# Patient Record
Sex: MALE | Race: BLACK OR AFRICAN AMERICAN | Employment: FULL TIME | URBAN - METROPOLITAN AREA
[De-identification: names, ages, dates, MRNs, and addresses within clinical notes are randomized per-mention and may not be internally consistent; named-entity substitution may affect disease eponyms.]

---

## 2017-04-10 PROCEDURE — 99284 EMERGENCY DEPT VISIT MOD MDM: CPT | Performed by: EMERGENCY MEDICINE

## 2017-04-10 PROCEDURE — 77030013733

## 2017-04-11 ENCOUNTER — HOSPITAL ENCOUNTER (EMERGENCY)
Age: 32
Discharge: HOME | End: 2017-04-11
Attending: EMERGENCY MEDICINE
Payer: OTHER MISCELLANEOUS

## 2017-04-11 VITALS
RESPIRATION RATE: 18 BRPM | DIASTOLIC BLOOD PRESSURE: 62 MMHG | SYSTOLIC BLOOD PRESSURE: 110 MMHG | TEMPERATURE: 98.1 F | HEART RATE: 68 BPM | BODY MASS INDEX: 17.18 KG/M2 | OXYGEN SATURATION: 97 % | HEIGHT: 70 IN | WEIGHT: 120 LBS

## 2017-04-11 DIAGNOSIS — H10.211 CHEMICAL CONJUNCTIVITIS OF RIGHT EYE: Primary | ICD-10-CM

## 2017-04-11 PROCEDURE — 74011000250 HC RX REV CODE- 250: Performed by: EMERGENCY MEDICINE

## 2017-04-11 PROCEDURE — 74011250637 HC RX REV CODE- 250/637: Performed by: EMERGENCY MEDICINE

## 2017-04-11 RX ORDER — OXYCODONE HYDROCHLORIDE 5 MG/1
5 TABLET ORAL
Status: COMPLETED | OUTPATIENT
Start: 2017-04-11 | End: 2017-04-11

## 2017-04-11 RX ORDER — TETRACAINE HYDROCHLORIDE 5 MG/ML
2 SOLUTION OPHTHALMIC
Status: COMPLETED | OUTPATIENT
Start: 2017-04-11 | End: 2017-04-11

## 2017-04-11 RX ORDER — KETOROLAC TROMETHAMINE 5 MG/ML
1 SOLUTION OPHTHALMIC 4 TIMES DAILY
Qty: 1 BOTTLE | Refills: 0 | Status: SHIPPED | OUTPATIENT
Start: 2017-04-11 | End: 2017-04-16

## 2017-04-11 RX ORDER — OXYCODONE HYDROCHLORIDE 5 MG/1
5 TABLET ORAL
Qty: 19 TAB | Refills: 0 | Status: SHIPPED | OUTPATIENT
Start: 2017-04-11 | End: 2017-04-26

## 2017-04-11 RX ADMIN — FLUORESCEIN SODIUM 1 STRIP: 1 STRIP OPHTHALMIC at 00:28

## 2017-04-11 RX ADMIN — TETRACAINE HYDROCHLORIDE 2 DROP: 5 SOLUTION OPHTHALMIC at 00:28

## 2017-04-11 RX ADMIN — OXYCODONE HYDROCHLORIDE 5 MG: 5 TABLET ORAL at 03:28

## 2017-04-11 NOTE — ED PROVIDER NOTES
HPI Comments: 28-year-old male working at [employer] when a splash from a broken dishwashing liquid bottle splashed in his right eye. Patient states that he will rinse his eye with water at work, but due to persistent discomfort, presented to the ER for further evaluation. No prior history of eye injuries. Does not work contacts    Patient is a 32 y.o. male presenting with eye pain. The history is provided by the patient. Eye Pain    This is a new problem. The current episode started 1 to 2 hours ago. The problem occurs constantly. The problem has been gradually improving. The right eye is affected. The injury mechanism was a chemical exposure (patient splashed dishwashing soap in his right eye at work). The pain is moderate. There is no known exposure to pink eye. He does not wear contacts. Associated symptoms include blurred vision, eye redness and pain. Pertinent negatives include no numbness, no discharge, no photophobia, no nausea, no vomiting, no tingling, no weakness, no itching, no fever, no blindness, no head injury and no dizziness. He has tried water for the symptoms. No past medical history on file. No past surgical history on file. No family history on file. Social History     Social History    Marital status: SINGLE     Spouse name: N/A    Number of children: N/A    Years of education: N/A     Occupational History    Not on file. Social History Main Topics    Smoking status: Never Smoker    Smokeless tobacco: Never Used    Alcohol use Yes      Comment: occa    Drug use: No    Sexual activity: Not on file     Other Topics Concern    Not on file     Social History Narrative         ALLERGIES: Review of patient's allergies indicates no known allergies. Review of Systems   Constitutional: Negative for activity change, chills, diaphoresis and fever. HENT: Negative for dental problem, hearing loss, nosebleeds, rhinorrhea and sore throat.     Eyes: Positive for blurred vision, pain and redness. Negative for blindness, photophobia, discharge and visual disturbance. Respiratory: Negative for cough, chest tightness and shortness of breath. Cardiovascular: Negative for chest pain, palpitations and leg swelling. Gastrointestinal: Negative for abdominal pain, constipation, diarrhea, nausea and vomiting. Endocrine: Negative for cold intolerance, heat intolerance, polydipsia and polyuria. Genitourinary: Negative for dysuria and flank pain. Musculoskeletal: Negative for arthralgias, back pain, joint swelling, myalgias and neck pain. Skin: Negative for itching, pallor and rash. Allergic/Immunologic: Negative for environmental allergies and food allergies. Neurological: Negative for dizziness, tingling, tremors, weakness, light-headedness, numbness and headaches. Hematological: Negative for adenopathy. Does not bruise/bleed easily. Psychiatric/Behavioral: Negative for confusion and dysphoric mood. The patient is not nervous/anxious and is not hyperactive. All other systems reviewed and are negative. Vitals:    04/11/17 0012   BP: 117/57   Pulse: 65   Resp: 18   Temp: 98.1 °F (36.7 °C)   SpO2: 98%   Weight: 54.4 kg (120 lb)   Height: 5' 10\" (1.778 m)            Physical Exam   Constitutional: He is oriented to person, place, and time. He appears well-developed and well-nourished. No distress. HENT:   Head: Normocephalic and atraumatic. Mouth/Throat: Oropharynx is clear and moist.   Eyes: Conjunctivae and EOM are normal. Pupils are equal, round, and reactive to light. Right eye exhibits no discharge. Left eye exhibits no discharge. No scleral icterus. Conjunctiva and sclera of the right eye are mildly erythematous and appears somewhat irritated. Pupils equal  To the left side  Pupil is reactive  Pupil shape is rounded     Neck: Normal range of motion. Neck supple. No JVD present.    Cardiovascular: Normal rate, regular rhythm, normal heart sounds and intact distal pulses. Exam reveals no gallop and no friction rub. No murmur heard. Pulmonary/Chest: Effort normal and breath sounds normal. No respiratory distress. He has no wheezes. Abdominal: Soft. Bowel sounds are normal. He exhibits no distension. There is no tenderness. There is no rebound and no guarding. Musculoskeletal: Normal range of motion. He exhibits no edema or tenderness. Lymphadenopathy:     He has no cervical adenopathy. Neurological: He is alert and oriented to person, place, and time. He has normal strength. No cranial nerve deficit or sensory deficit. He exhibits normal muscle tone. GCS eye subscore is 4. GCS verbal subscore is 5. GCS motor subscore is 6. Skin: Skin is warm and dry. No rash noted. He is not diaphoretic. No erythema. Psychiatric: He has a normal mood and affect. His speech is normal and behavior is normal. Judgment and thought content normal. Cognition and memory are normal.   Nursing note and vitals reviewed. MDM  Number of Diagnoses or Management Options  Chemical conjunctivitis of right eye: new and requires workup  Diagnosis management comments: Tetracaine and forcing applied to the right eye. There is no floor seen uptake noted on direct examination with a Woods lamp. We'll go ahead and irrigated the eye with 1 L normal saline via Lexa lens. Plan to check visual acuities    Likely this is only a mild chemical irritant related to the dishwashing soap. Amount and/or Complexity of Data Reviewed  Tests in the medicine section of CPT®: ordered and reviewed  Review and summarize past medical records: yes    Risk of Complications, Morbidity, and/or Mortality  Presenting problems: moderate  Diagnostic procedures: moderate  Management options: moderate  General comments: Elements of this note have been dictated via voice recognition software. Text and phrases may be limited by the accuracy of the software.   The chart has been reviewed, but errors may still be present.       Patient Progress  Patient progress: improved    ED Course       Procedures

## 2017-04-11 NOTE — ED NOTES
I have reviewed discharge instructions with the patient. The patient verbalized understanding. Patient had a eye patch, eye protector, wrapped in coban placed over the right eye prior to discharge. Patient called ride to come pick him up.

## 2017-04-11 NOTE — DISCHARGE INSTRUCTIONS
Pinkeye: Care Instructions  Your Care Instructions    Pinkeye is redness and swelling of the eye surface and the conjunctiva (the lining of the eyelid and the covering of the white part of the eye). Pinkeye is also called conjunctivitis. Pinkeye is often caused by infection with bacteria or a virus. Dry air, allergies, smoke, and chemicals are other common causes. Pinkeye often clears on its own in 7 to 10 days. Antibiotics only help if the pinkeye is caused by bacteria. Pinkeye caused by infection spreads easily. If an allergy or chemical is causing pinkeye, it will not go away unless you can avoid whatever is causing it. Follow-up care is a key part of your treatment and safety. Be sure to make and go to all appointments, and call your doctor if you are having problems. Its also a good idea to know your test results and keep a list of the medicines you take. How can you care for yourself at home? · Wash your hands often. Always wash them before and after you treat pinkeye or touch your eyes or face. · Use moist cotton or a clean, wet cloth to remove crust. Wipe from the inside corner of the eye to the outside. Use a clean part of the cloth for each wipe. · Put cold or warm wet cloths on your eye a few times a day if the eye hurts. · Do not wear contact lenses or eye makeup until the pinkeye is gone. Throw away any eye makeup you were using when you got pinkeye. Clean your contacts and storage case. If you wear disposable contacts, use a new pair when your eye has cleared and it is safe to wear contacts again. · If the doctor gave you antibiotic ointment or eyedrops, use them as directed. Use the medicine for as long as instructed, even if your eye starts looking better soon. Keep the bottle tip clean, and do not let it touch the eye area. · To put in eyedrops or ointment:  ¨ Tilt your head back, and pull your lower eyelid down with one finger.   ¨ Drop or squirt the medicine inside the lower lid.  ¨ Close your eye for 30 to 60 seconds to let the drops or ointment move around. ¨ Do not touch the ointment or dropper tip to your eyelashes or any other surface. · Do not share towels, pillows, or washcloths while you have pinkeye. When should you call for help? Call your doctor now or seek immediate medical care if:  · You have pain in your eye, not just irritation on the surface. · You have a change in vision or loss of vision. · You have an increase in discharge from the eye. · Your eye has not started to improve or begins to get worse within 48 hours after you start using antibiotics. · Pinkeye lasts longer than 7 days. Watch closely for changes in your health, and be sure to contact your doctor if you have any problems. Where can you learn more? Go to http://li-dalila.info/. Enter Y392 in the search box to learn more about \"Pinkeye: Care Instructions. \"  Current as of: May 27, 2016  Content Version: 11.2  © 7705-4043 [employer]. Care instructions adapted under license by [employer] (which disclaims liability or warranty for this information). If you have questions about a medical condition or this instruction, always ask your healthcare professional. [address] any warranty or liability for your use of this information.

## 2017-04-26 ENCOUNTER — HOSPITAL ENCOUNTER (EMERGENCY)
Age: 32
Discharge: HOME | End: 2017-04-26
Attending: EMERGENCY MEDICINE
Payer: SELF-PAY

## 2017-04-26 VITALS
SYSTOLIC BLOOD PRESSURE: 102 MMHG | BODY MASS INDEX: 17.18 KG/M2 | WEIGHT: 120 LBS | DIASTOLIC BLOOD PRESSURE: 68 MMHG | TEMPERATURE: 98.8 F | HEIGHT: 70 IN | RESPIRATION RATE: 16 BRPM | OXYGEN SATURATION: 98 % | HEART RATE: 80 BPM

## 2017-04-26 DIAGNOSIS — R68.89 FLU-LIKE SYMPTOMS: ICD-10-CM

## 2017-04-26 DIAGNOSIS — J02.9 SORE THROAT: Primary | ICD-10-CM

## 2017-04-26 LAB
APPEARANCE UR: CLEAR
BACTERIA URNS QL MICRO: 0 /HPF
BILIRUB UR QL: NEGATIVE
CASTS URNS QL MICRO: 0 /LPF
COLOR UR: YELLOW
DEPRECATED S PYO AG THROAT QL EIA: NEGATIVE
EPI CELLS #/AREA URNS HPF: 0 /HPF
FLUAV AG NPH QL IA: NEGATIVE
FLUBV AG NPH QL IA: NEGATIVE
GLUCOSE UR STRIP.AUTO-MCNC: NEGATIVE MG/DL
HGB UR QL STRIP: ABNORMAL
KETONES UR QL STRIP.AUTO: NEGATIVE MG/DL
LEUKOCYTE ESTERASE UR QL STRIP.AUTO: NEGATIVE
NITRITE UR QL STRIP.AUTO: NEGATIVE
PH UR STRIP: 5.5 [PH] (ref 5–9)
PROT UR STRIP-MCNC: NEGATIVE MG/DL
RBC #/AREA URNS HPF: ABNORMAL /HPF
SP GR UR REFRACTOMETRY: 1.02 (ref 1–1.02)
UROBILINOGEN UR QL STRIP.AUTO: 0.2 EU/DL (ref 0.2–1)
WBC URNS QL MICRO: ABNORMAL /HPF

## 2017-04-26 PROCEDURE — 99283 EMERGENCY DEPT VISIT LOW MDM: CPT | Performed by: EMERGENCY MEDICINE

## 2017-04-26 PROCEDURE — 87804 INFLUENZA ASSAY W/OPTIC: CPT | Performed by: EMERGENCY MEDICINE

## 2017-04-26 PROCEDURE — 87880 STREP A ASSAY W/OPTIC: CPT | Performed by: EMERGENCY MEDICINE

## 2017-04-26 PROCEDURE — 87081 CULTURE SCREEN ONLY: CPT | Performed by: EMERGENCY MEDICINE

## 2017-04-26 PROCEDURE — 81003 URINALYSIS AUTO W/O SCOPE: CPT | Performed by: EMERGENCY MEDICINE

## 2017-04-26 PROCEDURE — 81001 URINALYSIS AUTO W/SCOPE: CPT | Performed by: PHYSICIAN ASSISTANT

## 2017-04-26 RX ORDER — IBUPROFEN 600 MG/1
600 TABLET ORAL
Qty: 20 TAB | Refills: 0 | Status: SHIPPED | OUTPATIENT
Start: 2017-04-26 | End: 2017-08-06

## 2017-04-26 NOTE — LETTER
[facility] EMERGENCY DEPT [address] [address] 
[phone] Work/School Note Date: 4/26/2017 To Whom It May concern: Kylie Baker was seen and treated today in the emergency room by the following provider(s): 
Attending Provider: Radha Lambert MD 
Physician Assistant: Alexander Gomez. Kylie Baker SHOULD BE EXCUSED FROM WORK ON 4/26/2017. Sincerely, KHADRA Gomez

## 2017-04-27 ENCOUNTER — HOSPITAL ENCOUNTER (EMERGENCY)
Age: 32
Discharge: HOME | End: 2017-04-27
Attending: EMERGENCY MEDICINE
Payer: SELF-PAY

## 2017-04-27 VITALS
TEMPERATURE: 99 F | HEIGHT: 70 IN | HEART RATE: 79 BPM | SYSTOLIC BLOOD PRESSURE: 98 MMHG | BODY MASS INDEX: 17.18 KG/M2 | RESPIRATION RATE: 18 BRPM | OXYGEN SATURATION: 100 % | WEIGHT: 120 LBS | DIASTOLIC BLOOD PRESSURE: 65 MMHG

## 2017-04-27 DIAGNOSIS — J02.9 VIRAL PHARYNGITIS: Primary | ICD-10-CM

## 2017-04-27 PROCEDURE — 74011250637 HC RX REV CODE- 250/637: Performed by: EMERGENCY MEDICINE

## 2017-04-27 PROCEDURE — 99283 EMERGENCY DEPT VISIT LOW MDM: CPT | Performed by: EMERGENCY MEDICINE

## 2017-04-27 RX ORDER — IBUPROFEN 800 MG/1
800 TABLET ORAL
Status: COMPLETED | OUTPATIENT
Start: 2017-04-27 | End: 2017-04-27

## 2017-04-27 RX ADMIN — IBUPROFEN 800 MG: 800 TABLET ORAL at 19:48

## 2017-04-27 NOTE — LETTER
NOTIFICATION RETURN TO WORK / SCHOOL 
 
4/27/2017 7:53 PM 
 
Mr. Kylie Baker 
[address] [place] [id number] To Whom It May Concern: Kylie Baker is currently under the care of [facility] EMERGENCY DEPT. He will return to work/school on: 4/28/2017 If there are questions or concerns please have the patient contact our office.  
 
 
 
Sincerely, 
 
 
Martine Sun RN

## 2017-04-27 NOTE — ED PROVIDER NOTES
HPI Comments: Patient states that at 5:00 AM today he woke up with a sore throat and painful swallowing. He states he had generalized body aches and \"felt feverish\". Patient states he felt \"bad\" this morning and did not go to work and is requesting a note to be out of work today. Patient is a 28 y.o. male presenting with sore throat. The history is provided by the patient. Sore Throat    This is a new problem. The current episode started 12 to 24 hours ago. The problem has not changed since onset. Patient reports a subjective fever - was not measured. The fever has been present for less than 1 day. Associated symptoms include diarrhea and congestion. Pertinent negatives include no vomiting, no drooling, no ear pain, no headaches, no shortness of breath, no swollen glands, no trouble swallowing, no stiff neck and no cough. He has tried nothing for the symptoms. No past medical history on file. No past surgical history on file. No family history on file. Social History     Social History    Marital status: SINGLE     Spouse name: N/A    Number of children: N/A    Years of education: N/A     Occupational History    Not on file. Social History Main Topics    Smoking status: Never Smoker    Smokeless tobacco: Never Used    Alcohol use Yes      Comment: occa    Drug use: No    Sexual activity: Not on file     Other Topics Concern    Not on file     Social History Narrative         ALLERGIES: Review of patient's allergies indicates no known allergies. Review of Systems   Constitutional: Negative for chills, diaphoresis, fatigue and fever. HENT: Positive for congestion and sore throat. Negative for drooling, ear pain, facial swelling, postnasal drip, rhinorrhea, sneezing and trouble swallowing. Respiratory: Negative for cough, shortness of breath and wheezing. Gastrointestinal: Positive for diarrhea. Negative for abdominal pain, nausea and vomiting.    Musculoskeletal: Positive for myalgias. Negative for arthralgias, neck pain and neck stiffness. Skin: Negative for rash. Neurological: Negative for dizziness, speech difficulty, weakness, light-headedness and headaches. Hematological: Negative for adenopathy. All other systems reviewed and are negative. Vitals:    04/26/17 2130   BP: 96/69   Pulse: 82   Resp: 16   Temp: 99.2 °F (37.3 °C)   SpO2: 98%   Weight: 54.4 kg (120 lb)   Height: 5' 10\" (1.778 m)            Physical Exam   Constitutional: He is oriented to person, place, and time. Vital signs are normal. He appears well-developed and well-nourished. He is active. Non-toxic appearance. He does not appear ill. No distress. Patient is ambulatory and moving about and exam area without difficulty. Patient with clear non-garbled speech. HENT:   Head: Normocephalic and atraumatic. Right Ear: Tympanic membrane normal.   Left Ear: Tympanic membrane normal.   Nose: Nose normal.   Mouth/Throat: Uvula is midline. Posterior oropharyngeal erythema present. No oropharyngeal exudate, posterior oropharyngeal edema or tonsillar abscesses. Eyes: Conjunctivae and EOM are normal. Pupils are equal, round, and reactive to light. Neck: Normal range of motion. Neck supple. No spinous process tenderness and no muscular tenderness present. Cardiovascular: Normal rate, regular rhythm, S1 normal, S2 normal, normal heart sounds and normal pulses. Exam reveals no gallop. No murmur heard. Pulmonary/Chest: Effort normal and breath sounds normal. No accessory muscle usage. No respiratory distress. He has no decreased breath sounds. He has no wheezes. He has no rhonchi. He exhibits no tenderness. Abdominal: Soft. Normal appearance and bowel sounds are normal. He exhibits no distension, no abdominal bruit and no pulsatile midline mass. There is no hepatosplenomegaly. There is no tenderness. There is no rigidity, no rebound, no guarding and no CVA tenderness.    Neurological: He is alert and oriented to person, place, and time. He has normal strength and normal reflexes. No cranial nerve deficit or sensory deficit. Coordination and gait normal.   Skin: Skin is warm, dry and intact. Psychiatric: He has a normal mood and affect. His speech is normal and behavior is normal.   Nursing note and vitals reviewed. MDM  Number of Diagnoses or Management Options  Flu-like symptoms: new and requires workup  Sore throat: new and requires workup  Diagnosis management comments: This is likely a viral pharyngitis. Patient is prescribed Motrin for symptomatic treatment. He is advised to return with any change in symptoms or worsening symptoms. Amount and/or Complexity of Data Reviewed  Clinical lab tests: ordered and reviewed    Risk of Complications, Morbidity, and/or Mortality  Presenting problems: low  Diagnostic procedures: low  Management options: low    Patient Progress  Patient progress: stable    ED Course       Procedures      Rapid strep, Influenza A&B is negative. I discussed the results of all labs, procedures, radiographs, and treatments with the patient and available family. Treatment plan is agreed upon and the patient is ready for discharge. Questions about treatment in the ED and differential diagnosis of presenting condition were answered. Patient was given verbal discharge instructions including, but not limited to, importance of returning to the emergency department for any concern of worsening or continued symptoms. Instructions were given to follow up with a primary care provider or specialist within 1-2 days. Adverse effects of medications, if prescribed, were discussed and patient was advised to refrain from significant physical activity until followed up by primary care physician and to not drive or operate heavy machinery after taking any sedating substances.

## 2017-04-27 NOTE — ED TRIAGE NOTES
Pt states he has a sore throat and right ear pain. Pt states he was seen here in the ED last night. Pt had a neg Strep swab. Pt states he cannot ear or drink because the pain is so great.

## 2017-04-27 NOTE — DISCHARGE INSTRUCTIONS
Your strep test and flu test are NEGATIVE in ER tonight. Sore Throat: Care Instructions  Your Care Instructions    Infection by bacteria or a virus causes most sore throats. Cigarette smoke, dry air, air pollution, allergies, and yelling can also cause a sore throat. Sore throats can be painful and annoying. Fortunately, most sore throats go away on their own. If you have a bacterial infection, your doctor may prescribe antibiotics. Follow-up care is a key part of your treatment and safety. Be sure to make and go to all appointments, and call your doctor if you are having problems. It's also a good idea to know your test results and keep a list of the medicines you take. How can you care for yourself at home? · If your doctor prescribed antibiotics, take them as directed. Do not stop taking them just because you feel better. You need to take the full course of antibiotics. · Gargle with warm salt water once an hour to help reduce swelling and relieve discomfort. Use 1 teaspoon of salt mixed in 1 cup of warm water. · Take an over-the-counter pain medicine, such as acetaminophen (Tylenol), ibuprofen (Advil, Motrin), or naproxen (Aleve). Read and follow all instructions on the label. · Be careful when taking over-the-counter cold or flu medicines and Tylenol at the same time. Many of these medicines have acetaminophen, which is Tylenol. Read the labels to make sure that you are not taking more than the recommended dose. Too much acetaminophen (Tylenol) can be harmful. · Drink plenty of fluids. Fluids may help soothe an irritated throat. Hot fluids, such as tea or soup, may help decrease throat pain. · Use over-the-counter throat lozenges to soothe pain. Regular cough drops or hard candy may also help. These should not be given to young children because of the risk of choking. · Do not smoke or allow others to smoke around you.  If you need help quitting, talk to your doctor about stop-smoking programs and medicines. These can increase your chances of quitting for good. · Use a vaporizer or humidifier to add moisture to your bedroom. Follow the directions for cleaning the machine. When should you call for help? Call your doctor now or seek immediate medical care if:  · You have new or worse trouble swallowing. · Your sore throat gets much worse on one side. Watch closely for changes in your health, and be sure to contact your doctor if you do not get better as expected. Where can you learn more? Go to http://li-dalila.info/. Enter 062 [id number] 80 [id number] in the search box to learn more about \"Sore Throat: Care Instructions. \"  Current as of: July 29, 2016  Content Version: 11.2  © 2902-2299 [employer]. Care instructions adapted under license by [employer] (which disclaims liability or warranty for this information). If you have questions about a medical condition or this instruction, always ask your healthcare professional. [place] [address] any warranty or liability for your use of this information.

## 2017-04-27 NOTE — DISCHARGE INSTRUCTIONS
Sore Throat: Care Instructions  Your Care Instructions    Infection by bacteria or a virus causes most sore throats. Cigarette smoke, dry air, air pollution, allergies, and yelling can also cause a sore throat. Sore throats can be painful and annoying. Fortunately, most sore throats go away on their own. If you have a bacterial infection, your doctor may prescribe antibiotics. Follow-up care is a key part of your treatment and safety. Be sure to make and go to all appointments, and call your doctor if you are having problems. It's also a good idea to know your test results and keep a list of the medicines you take. How can you care for yourself at home? · If your doctor prescribed antibiotics, take them as directed. Do not stop taking them just because you feel better. You need to take the full course of antibiotics. · Gargle with warm salt water once an hour to help reduce swelling and relieve discomfort. Use 1 teaspoon of salt mixed in 1 cup of warm water. · Take an over-the-counter pain medicine, such as acetaminophen (Tylenol), ibuprofen (Advil, Motrin), or naproxen (Aleve). Read and follow all instructions on the label. · Be careful when taking over-the-counter cold or flu medicines and Tylenol at the same time. Many of these medicines have acetaminophen, which is Tylenol. Read the labels to make sure that you are not taking more than the recommended dose. Too much acetaminophen (Tylenol) can be harmful. · Drink plenty of fluids. Fluids may help soothe an irritated throat. Hot fluids, such as tea or soup, may help decrease throat pain. · Use over-the-counter throat lozenges to soothe pain. Regular cough drops or hard candy may also help. These should not be given to young children because of the risk of choking. · Do not smoke or allow others to smoke around you. If you need help quitting, talk to your doctor about stop-smoking programs and medicines.  These can increase your chances of quitting for good. · Use a vaporizer or humidifier to add moisture to your bedroom. Follow the directions for cleaning the machine. When should you call for help? Call your doctor now or seek immediate medical care if:  · You have new or worse trouble swallowing. · Your sore throat gets much worse on one side. Watch closely for changes in your health, and be sure to contact your doctor if you do not get better as expected. Where can you learn more? Go to http://li-dalila.info/. Enter [id number] [id number] in the search box to learn more about \"Sore Throat: Care Instructions. \"  Current as of: July 29, 2016  Content Version: 11.2  © 7859-3213 [employer]. Care instructions adapted under license by [employer] (which disclaims liability or warranty for this information). If you have questions about a medical condition or this instruction, always ask your healthcare professional. [address] any warranty or liability for your use of this information.

## 2017-04-29 LAB
BACTERIA SPEC CULT: NORMAL
SERVICE CMNT-IMP: NORMAL

## 2017-06-24 ENCOUNTER — HOSPITAL ENCOUNTER (EMERGENCY)
Age: 32
Discharge: HOME | End: 2017-06-24
Attending: EMERGENCY MEDICINE
Payer: SELF-PAY

## 2017-06-24 VITALS
TEMPERATURE: 98.3 F | HEART RATE: 80 BPM | RESPIRATION RATE: 16 BRPM | WEIGHT: 120 LBS | BODY MASS INDEX: 17.18 KG/M2 | HEIGHT: 70 IN | DIASTOLIC BLOOD PRESSURE: 70 MMHG | SYSTOLIC BLOOD PRESSURE: 108 MMHG | OXYGEN SATURATION: 100 %

## 2017-06-24 DIAGNOSIS — K62.5 RECTAL BLEEDING: Primary | ICD-10-CM

## 2017-06-24 LAB
BASOPHILS # BLD AUTO: 0 K/UL (ref 0–0.2)
BASOPHILS # BLD: 1 % (ref 0–2)
DIFFERENTIAL METHOD BLD: ABNORMAL
EOSINOPHIL # BLD: 0.1 K/UL (ref 0–0.8)
EOSINOPHIL NFR BLD: 3 % (ref 0.5–7.8)
ERYTHROCYTE [DISTWIDTH] IN BLOOD BY AUTOMATED COUNT: 12.2 % (ref 11.9–14.6)
HCT VFR BLD AUTO: 39.7 % (ref 41.1–50.3)
HGB BLD-MCNC: 14.4 G/DL (ref 13.6–17.2)
IMM GRANULOCYTES # BLD: 0 K/UL (ref 0–0.5)
IMM GRANULOCYTES NFR BLD AUTO: 0 % (ref 0–5)
LYMPHOCYTES # BLD AUTO: 44 % (ref 13–44)
LYMPHOCYTES # BLD: 2 K/UL (ref 0.5–4.6)
MCH RBC QN AUTO: 32.3 PG (ref 26.1–32.9)
MCHC RBC AUTO-ENTMCNC: 36.3 G/DL (ref 31.4–35)
MCV RBC AUTO: 89 FL (ref 79.6–97.8)
MONOCYTES # BLD: 0.1 K/UL (ref 0.1–1.3)
MONOCYTES NFR BLD AUTO: 3 % (ref 4–12)
NEUTS SEG # BLD: 2.4 K/UL (ref 1.7–8.2)
NEUTS SEG NFR BLD AUTO: 49 % (ref 43–78)
PLATELET # BLD AUTO: 298 K/UL (ref 150–450)
PLATELET COMMENTS,PCOM: ADEQUATE
PMV BLD AUTO: 9.1 FL (ref 10.8–14.1)
RBC # BLD AUTO: 4.46 M/UL (ref 4.23–5.67)
RBC MORPH BLD: ABNORMAL
WBC # BLD AUTO: 4.6 K/UL (ref 4.3–11.1)
WBC MORPH BLD: ABNORMAL

## 2017-06-24 PROCEDURE — 99283 EMERGENCY DEPT VISIT LOW MDM: CPT | Performed by: EMERGENCY MEDICINE

## 2017-06-24 PROCEDURE — 85025 COMPLETE CBC W/AUTO DIFF WBC: CPT | Performed by: EMERGENCY MEDICINE

## 2017-06-25 NOTE — ED PROVIDER NOTES
Patient is a 28 y.o. male presenting with anal bleeding. The history is provided by the patient. Rectal Bleeding    This is a new problem. The current episode started 12 to 24 hours ago. The stool is described as bloody coated. Pertinent negatives include no abdominal pain, no abdominal distention, no fever and no nausea. Risk factors include a recent illness. He has tried nothing for the symptoms. History reviewed. No pertinent past medical history. History reviewed. No pertinent surgical history. History reviewed. No pertinent family history. Social History     Social History    Marital status: SINGLE     Spouse name: N/A    Number of children: N/A    Years of education: N/A     Occupational History    Not on file. Social History Main Topics    Smoking status: Never Smoker    Smokeless tobacco: Never Used    Alcohol use Yes      Comment: occa    Drug use: No    Sexual activity: Not on file     Other Topics Concern    Not on file     Social History Narrative         ALLERGIES: Review of patient's allergies indicates no known allergies. Review of Systems   Constitutional: Negative. Negative for fever. HENT: Negative. Respiratory: Negative. Cardiovascular: Negative. Gastrointestinal: Positive for anal bleeding. Negative for abdominal distention, abdominal pain and nausea. Endocrine: Negative. Genitourinary: Negative. Musculoskeletal: Negative. Skin: Negative. Neurological: Negative. Hematological: Negative. Vitals:    06/24/17 1856   BP: 105/68   Pulse: 78   Resp: 16   Temp: 98.3 °F (36.8 °C)   SpO2: 98%   Weight: 54.4 kg (120 lb)   Height: 5' 10\" (1.778 m)            Physical Exam   Constitutional: He is oriented to person, place, and time. He appears well-developed and well-nourished. No distress. HENT:   Head: Normocephalic and atraumatic. Cardiovascular: Intact distal pulses. Pulmonary/Chest: Effort normal.   Abdominal: Soft.  He exhibits no distension. There is no tenderness. There is no rebound. Neurological: He is alert and oriented to person, place, and time. Skin: Skin is warm and dry. Psychiatric: He has a normal mood and affect. His behavior is normal.   Nursing note and vitals reviewed. MDM  Number of Diagnoses or Management Options  Rectal bleeding: new and requires workup  Diagnosis management comments: Patient states that he has noticed a couple of times that he had red blood mixed in his stool. Patient feels that his stools have not been particularly hard but has had similar in the past due to internal hemorrhoids. Patient states he has no other additional symptoms. He states he has had bowel movements since the initial episode yesterday which have improved and now has much less noted. Patient came to the ER at the urging of family to be evaluated but has no other specific complaints. No reported history of anal intercourse or manipulation. In ER alone currently. Discussed with patient we would check his hemoglobin level and stressed expected course and follow-up. Patient understands that only external hemorrhoids or thrombosed hemorrhoids have significant pain in that internal hemorrhoids could be present with some bleeding but may not have any pain. Patient states that he has had a history of prolapsed internal hemorrhoids in the past.  Understands that if he has unusual course or bleeding may ultimately require endoscopy to evaluate but at this point agreed that it could be benign with a single episode of bleeding with minimal blood noted especially if he will rate stable otherwise no symptoms. However patient stands that he has recurrence of symptoms or other concerns that he may again ultimately benefit from seeing a gastroenterologist.  At no point should he have any severe pain, fever or other concerns and she'll return to the ER promptly for this.    Also should not have any significant bleeding since has already improved.        Amount and/or Complexity of Data Reviewed  Clinical lab tests: ordered and reviewed (Results for orders placed or performed during the hospital encounter of 06/24/17  -CBC WITH AUTOMATED DIFF       Result                                            Value                         Ref Range                       WBC                                               4.6                           4.3 - 11.1 K/uL                 RBC                                               4.46                          4.23 - 5.67 M/uL                HGB                                               14.4                          13.6 - 17.2 g/dL                HCT                                               39.7 (L)                      41.1 - 50.3 %                   MCV                                               89.0                          79.6 - 97.8 FL                  MCH                                               32.3                          26.1 - 32.9 PG                  MCHC                                              36.3 (H)                      31.4 - 35.0 g/dL                RDW                                               12.2                          11.9 - 14.6 %                   PLATELET                                          298                           150 - 450 K/uL                  MPV                                               9.1 (L)                       10.8 - 14.1 FL                  NEUTROPHILS                                       49                            43 - 78 %                       LYMPHOCYTES                                       44                            13 - 44 %                       MONOCYTES                                         3 (L)                         4.0 - 12.0 %                    EOSINOPHILS                                       3                             0.5 - 7.8 %                     BASOPHILS 1                             0.0 - 2.0 %                     IMMATURE GRANULOCYTES                             0                             0.0 - 5.0 %                     ABS. NEUTROPHILS                                  2.4                           1.7 - 8.2 K/UL                  ABS. LYMPHOCYTES                                  2.0                           0.5 - 4.6 K/UL                  ABS. MONOCYTES                                    0.1                           0.1 - 1.3 K/UL                  ABS. EOSINOPHILS                                  0.1                           0.0 - 0.8 K/UL                  ABS. BASOPHILS                                    0.0                           0.0 - 0.2 K/UL                  ABS. IMM.  GRANS.                                  0.0                           0.0 - 0.5 K/UL                  RBC COMMENTS                                      NORMOCYTIC/NORMOCHROMIC                                       WBC COMMENTS                                      OCCASIONAL                                                    PLATELET COMMENTS                                 ADEQUATE                                                      DF                                                AUTOMATED                                                )      ED Course       Procedures

## 2017-06-25 NOTE — DISCHARGE INSTRUCTIONS
Rectal Bleeding: Care Instructions  Your Care Instructions  Rectal bleeding in small amounts is common. You may see red spotting on toilet paper or drops of blood in the toilet. Rectal bleeding has many possible causes, from something as minor as hemorrhoids to something as serious as colon cancer. You may need more tests to find the cause of your bleeding. Follow-up care is a key part of your treatment and safety. Be sure to make and go to all appointments, and call your doctor if you are having problems. Its also a good idea to know your test results and keep a list of the medicines you take. How can you care for yourself at home? · Avoid aspirin and other nonsteroidal anti-inflammatory drugs (NSAIDs), such as ibuprofen (Advil, Motrin) and naproxen (Aleve). They can cause you to bleed more. Ask your doctor if you can take acetaminophen (Tylenol). Read and follow all instructions on the label. · Use a stool softener that contains bran or psyllium. You can save money by buying bran or psyllium (available in bulk at most health food stores) and sprinkling it on foods or stirring it into fruit juice. You can also use a product such as Metamucil or Citrucel. · Take your medicines exactly as directed. Call your doctor if you think you are having a problem with your medicine. When should you call for help? Call 911 anytime you think you may need emergency care. For example, call if:  · You passed out (lost consciousness). · You pass maroon or very bloody stools. · You vomit blood or what looks like coffee grounds. Call your doctor now or seek immediate medical care if:  · You have severe belly pain. · You have increased bleeding. · You are dizzy or lightheaded, or you feel like you may faint. · Your stools are black and tarlike. Watch closely for changes in your health, and be sure to contact your doctor if:  · You lose weight and do not know why. · You feel tired all the time.   · Your bleeding does not decrease after 2 days. Where can you learn more? Go to http://li-dalila.info/. Enter D393 in the search box to learn more about \"Rectal Bleeding: Care Instructions. \"  Current as of: August 9, 2016  Content Version: 11.3  © 4837-6981 [employer]. Care instructions adapted under license by [employer] (which disclaims liability or warranty for this information). If you have questions about a medical condition or this instruction, always ask your healthcare professional. [address] any warranty or liability for your use of this information.

## 2017-08-06 ENCOUNTER — HOSPITAL ENCOUNTER (EMERGENCY)
Age: 32
Discharge: HOME | End: 2017-08-06
Attending: EMERGENCY MEDICINE
Payer: SELF-PAY

## 2017-08-06 VITALS
TEMPERATURE: 98 F | HEART RATE: 60 BPM | RESPIRATION RATE: 16 BRPM | DIASTOLIC BLOOD PRESSURE: 79 MMHG | WEIGHT: 120 LBS | SYSTOLIC BLOOD PRESSURE: 101 MMHG | HEIGHT: 70 IN | BODY MASS INDEX: 17.18 KG/M2 | OXYGEN SATURATION: 99 %

## 2017-08-06 DIAGNOSIS — K04.7 DENTAL ABSCESS: Primary | ICD-10-CM

## 2017-08-06 PROCEDURE — 99282 EMERGENCY DEPT VISIT SF MDM: CPT | Performed by: PHYSICIAN ASSISTANT

## 2017-08-06 RX ORDER — CLINDAMYCIN HYDROCHLORIDE 150 MG/1
300 CAPSULE ORAL EVERY 6 HOURS
Qty: 80 CAP | Refills: 0 | Status: SHIPPED | OUTPATIENT
Start: 2017-08-06 | End: 2017-08-16

## 2017-08-06 RX ORDER — TRAMADOL HYDROCHLORIDE 50 MG/1
50 TABLET ORAL
Qty: 20 TAB | Refills: 0 | Status: SHIPPED | OUTPATIENT
Start: 2017-08-06 | End: 2018-11-20 | Stop reason: ALTCHOICE

## 2017-08-06 NOTE — ED PROVIDER NOTES
HPI Comments: The patient is here with a 2 day history of lower and upper dental pain. He does not have any fever, swelling to the area, nausea, vomiting or other symptoms. He has pain to the area. No chest pain, shortness of breath, abdominal pain or other symptoms. He was ambulatory to the room without difficulty and well-hydrated. Patient is a 28 y.o. male presenting with dental problem. The history is provided by the patient. Dental Pain    This is a new problem. The current episode started 2 days ago. The problem occurs constantly. The problem has been gradually worsening. The pain is located in the left lower and left upper mouth. The quality of the pain is aching. The pain is moderate. Associated symptoms include gum redness. There was no vomiting, no nausea, no fever, no swelling, no chest pain, no shortness of breath, no headaches and no drainage. He has tried nothing for the symptoms. The patient has no cardiac history. History reviewed. No pertinent past medical history. History reviewed. No pertinent surgical history. History reviewed. No pertinent family history. Social History     Social History    Marital status: SINGLE     Spouse name: N/A    Number of children: N/A    Years of education: N/A     Occupational History    Not on file. Social History Main Topics    Smoking status: Never Smoker    Smokeless tobacco: Never Used    Alcohol use Yes      Comment: occa    Drug use: No    Sexual activity: Not on file     Other Topics Concern    Not on file     Social History Narrative         ALLERGIES: Review of patient's allergies indicates no known allergies. Review of Systems   Constitutional: Negative. HENT: Positive for dental problem. Eyes: Negative. Respiratory: Negative. Cardiovascular: Negative. Gastrointestinal: Negative. Genitourinary: Negative. Musculoskeletal: Negative. Skin: Negative. Neurological: Negative. Psychiatric/Behavioral: Negative. All other systems reviewed and are negative. Vitals:    08/06/17 1045   BP: 101/79   Pulse: 60   Resp: 16   Temp: 98 °F (36.7 °C)   SpO2: 99%   Weight: 54.4 kg (120 lb)   Height: 5' 10\" (1.778 m)            Physical Exam   Constitutional: He is oriented to person, place, and time. He appears well-developed and well-nourished. HENT:   Head: Normocephalic and atraumatic. Right Ear: External ear normal.   Left Ear: External ear normal.   Nose: Nose normal.   Mouth/Throat: Uvula is midline, oropharynx is clear and moist and mucous membranes are normal. Abnormal dentition. Dental abscesses and dental caries present. Eyes: Conjunctivae and EOM are normal. Pupils are equal, round, and reactive to light. Neck: Normal range of motion. Neck supple. Cardiovascular: Normal rate, regular rhythm, normal heart sounds and intact distal pulses. Pulmonary/Chest: Effort normal and breath sounds normal.   Abdominal: Soft. Bowel sounds are normal.   Musculoskeletal: Normal range of motion. Neurological: He is alert and oriented to person, place, and time. He has normal reflexes. Skin: Skin is warm and dry. Psychiatric: He has a normal mood and affect. His behavior is normal. Judgment and thought content normal.   Nursing note and vitals reviewed. MDM  Number of Diagnoses or Management Options  Dental abscess: new and requires workup  Risk of Complications, Morbidity, and/or Mortality  Presenting problems: moderate  Diagnostic procedures: moderate  Management options: moderate    Patient Progress  Patient progress: stable    ED Course       Procedures      The patient was observed in the ED. Drink plenty of fluids, rest, take all of the antibiotics as directed and follow-up with a dentist for recheck. I discussed the results of all labs, procedures, radiographs, and treatments with the patient and available family.   Treatment plan is agreed upon and the patient is ready for discharge. All voiced understanding of the discharge plan and medication instructions or changes as appropriate. Questions about treatment in the ED were answered. All were encouraged to return should symptoms worsen or new problems develop.

## 2017-08-06 NOTE — LETTER
[facility] EMERGENCY DEPT [address] [address] 
[phone] Work/School Note Date: 8/6/2017 To Whom It May concern: Dillon Barrett was seen and treated today in the emergency room by the following provider(s): 
Attending Provider: Mark Atkins MD 
Physician Assistant: Oneda Cogan, PA. Dillon Barrett may return to work on 08/07/17. Sincerely, Oneda Cogan, PA

## 2017-08-06 NOTE — ED TRIAGE NOTES
Pt states history of headaches in the past but thinks that the reoccurrence of headaches could be due to his back teeth. States that he is going to go to the dentist but needs some pain relief.

## 2018-01-21 ENCOUNTER — HOSPITAL ENCOUNTER (EMERGENCY)
Age: 33
Discharge: HOME | End: 2018-01-21
Attending: EMERGENCY MEDICINE
Payer: COMMERCIAL

## 2018-01-21 VITALS
HEART RATE: 80 BPM | WEIGHT: 120 LBS | DIASTOLIC BLOOD PRESSURE: 65 MMHG | HEIGHT: 70 IN | SYSTOLIC BLOOD PRESSURE: 110 MMHG | RESPIRATION RATE: 16 BRPM | TEMPERATURE: 98.4 F | OXYGEN SATURATION: 98 % | BODY MASS INDEX: 17.18 KG/M2

## 2018-01-21 DIAGNOSIS — J02.9 ACUTE PHARYNGITIS, UNSPECIFIED ETIOLOGY: Primary | ICD-10-CM

## 2018-01-21 LAB — DEPRECATED S PYO AG THROAT QL EIA: NEGATIVE

## 2018-01-21 PROCEDURE — 87081 CULTURE SCREEN ONLY: CPT | Performed by: EMERGENCY MEDICINE

## 2018-01-21 PROCEDURE — 99282 EMERGENCY DEPT VISIT SF MDM: CPT | Performed by: EMERGENCY MEDICINE

## 2018-01-21 PROCEDURE — 87880 STREP A ASSAY W/OPTIC: CPT | Performed by: EMERGENCY MEDICINE

## 2018-01-22 NOTE — ED NOTES
I have reviewed discharge instructions with the patient. The patient verbalized understanding. Patient left ED via Discharge Method: ambulatory to Home with self. Opportunity for questions and clarification provided. Patient given 0 scripts. To continue your aftercare when you leave the hospital, you may receive an automated call from our care team to check in on how you are doing. This is a free service and part of our promise to provide the best care and service to meet your aftercare needs.  If you have questions, or wish to unsubscribe from this service please call [phone]. Thank you for Choosing our [facility] Emergency Department.

## 2018-01-22 NOTE — DISCHARGE INSTRUCTIONS
Sore Throat: Care Instructions  Your Care Instructions    Infection by bacteria or a virus causes most sore throats. Cigarette smoke, dry air, air pollution, allergies, and yelling can also cause a sore throat. Sore throats can be painful and annoying. Fortunately, most sore throats go away on their own. If you have a bacterial infection, your doctor may prescribe antibiotics. Follow-up care is a key part of your treatment and safety. Be sure to make and go to all appointments, and call your doctor if you are having problems. It's also a good idea to know your test results and keep a list of the medicines you take. How can you care for yourself at home? · If your doctor prescribed antibiotics, take them as directed. Do not stop taking them just because you feel better. You need to take the full course of antibiotics. · Gargle with warm salt water once an hour to help reduce swelling and relieve discomfort. Use 1 teaspoon of salt mixed in 1 cup of warm water. · Take an over-the-counter pain medicine, such as acetaminophen (Tylenol), ibuprofen (Advil, Motrin), or naproxen (Aleve). Read and follow all instructions on the label. · Be careful when taking over-the-counter cold or flu medicines and Tylenol at the same time. Many of these medicines have acetaminophen, which is Tylenol. Read the labels to make sure that you are not taking more than the recommended dose. Too much acetaminophen (Tylenol) can be harmful. · Drink plenty of fluids. Fluids may help soothe an irritated throat. Hot fluids, such as tea or soup, may help decrease throat pain. · Use over-the-counter throat lozenges to soothe pain. Regular cough drops or hard candy may also help. These should not be given to young children because of the risk of choking. · Do not smoke or allow others to smoke around you. If you need help quitting, talk to your doctor about stop-smoking programs and medicines.  These can increase your chances of quitting for good. · Use a vaporizer or humidifier to add moisture to your bedroom. Follow the directions for cleaning the machine. When should you call for help? Call your doctor now or seek immediate medical care if:  ? · You have new or worse trouble swallowing. ? · Your sore throat gets much worse on one side. ? Watch closely for changes in your health, and be sure to contact your doctor if you do not get better as expected. Where can you learn more? Go to http://li-dalila.info/. Enter 062 [id number] 80 [id number] in the search box to learn more about \"Sore Throat: Care Instructions. \"  Current as of: May 12, 2017  Content Version: 11.4  © 5424-3014 [employer]. Care instructions adapted under license by [employer] (which disclaims liability or warranty for this information). If you have questions about a medical condition or this instruction, always ask your healthcare professional. [address] any warranty or liability for your use of this information.

## 2018-01-22 NOTE — ED PROVIDER NOTES
HPI Comments: Patient states he has been having a sore throat and chills for the past 3 days. His son is at home with strep throat. He denies any vomiting or diarrhea. He has not taken any medicine for his symptoms and he denies any aggravating or alleviating factors. He is able to eat and drink with a moderate amount of discomfort. Elements of this note were created using speech recognition software. As such, errors of speech recognition may be present. Patient is a 28 y.o. male presenting with sore throat. The history is provided by the patient. Sore Throat    This is a new problem. The current episode started more than 2 days ago. The problem has not changed since onset. Patient reports a subjective fever - was not measured. Associated symptoms include ear pain. Pertinent negatives include no vomiting and no congestion. He has tried nothing for the symptoms. History reviewed. No pertinent past medical history. History reviewed. No pertinent surgical history. History reviewed. No pertinent family history. Social History     Social History    Marital status: SINGLE     Spouse name: N/A    Number of children: N/A    Years of education: N/A     Occupational History    Not on file. Social History Main Topics    Smoking status: Never Smoker    Smokeless tobacco: Never Used    Alcohol use Yes      Comment: occa    Drug use: No    Sexual activity: Not on file     Other Topics Concern    Not on file     Social History Narrative         ALLERGIES: Review of patient's allergies indicates no known allergies. Review of Systems   Constitutional: Positive for chills. Negative for appetite change. HENT: Positive for ear pain and sore throat. Negative for congestion. Gastrointestinal: Negative for nausea and vomiting. All other systems reviewed and are negative.       Vitals:    01/21/18 2127   BP: 110/65   Pulse: 80   Resp: 16   Temp: 98.4 °F (36.9 °C)   SpO2: 98%   Weight: 54.4 kg (120 lb)   Height: 5' 10\" (1.778 m)            Physical Exam   Constitutional: He is oriented to person, place, and time. He appears well-developed and well-nourished. HENT:   Head: Normocephalic and atraumatic. Mouth/Throat: No uvula swelling. Posterior oropharyngeal erythema present. No oropharyngeal exudate, posterior oropharyngeal edema or tonsillar abscesses. Eyes: Conjunctivae are normal. Pupils are equal, round, and reactive to light. Neck: Normal range of motion. Neck supple. Musculoskeletal: He exhibits no edema or tenderness. Neurological: He is alert and oriented to person, place, and time. Skin: Skin is warm and dry. Psychiatric: He has a normal mood and affect. His behavior is normal.   Nursing note and vitals reviewed.        MDM  Number of Diagnoses or Management Options  Acute pharyngitis, unspecified etiology:   Diagnosis management comments: Differential diagnosis: Strep throat, influenza, viral syndrome       Amount and/or Complexity of Data Reviewed  Clinical lab tests: ordered and reviewed (Results for orders placed or performed during the hospital encounter of 01/21/18  -STREP AG SCREEN, GROUP A       Result                                            Value                         Ref Range                       Group A Strep Ag ID                               NEGATIVE                      NEG                        )    Risk of Complications, Morbidity, and/or Mortality  Presenting problems: moderate  Diagnostic procedures: moderate  Management options: moderate      ED Course       Procedures

## 2018-01-24 LAB
BACTERIA SPEC CULT: NORMAL
SERVICE CMNT-IMP: NORMAL

## 2018-07-04 ENCOUNTER — HOSPITAL ENCOUNTER (EMERGENCY)
Age: 33
Discharge: HOME | End: 2018-07-04
Attending: EMERGENCY MEDICINE
Payer: COMMERCIAL

## 2018-07-04 VITALS
HEIGHT: 70 IN | RESPIRATION RATE: 18 BRPM | OXYGEN SATURATION: 97 % | SYSTOLIC BLOOD PRESSURE: 131 MMHG | WEIGHT: 120 LBS | DIASTOLIC BLOOD PRESSURE: 78 MMHG | BODY MASS INDEX: 17.18 KG/M2 | TEMPERATURE: 98.3 F | HEART RATE: 97 BPM

## 2018-07-04 DIAGNOSIS — R11.2 NON-INTRACTABLE VOMITING WITH NAUSEA, UNSPECIFIED VOMITING TYPE: Primary | ICD-10-CM

## 2018-07-04 DIAGNOSIS — J06.9 VIRAL UPPER RESPIRATORY INFECTION: ICD-10-CM

## 2018-07-04 LAB
ALBUMIN SERPL-MCNC: 3.5 G/DL (ref 3.5–5)
ALBUMIN/GLOB SERPL: 0.9 {RATIO} (ref 1.2–3.5)
ALP SERPL-CCNC: 104 U/L (ref 50–136)
ALT SERPL-CCNC: 51 U/L (ref 12–65)
ANION GAP SERPL CALC-SCNC: 6 MMOL/L (ref 7–16)
AST SERPL-CCNC: 24 U/L (ref 15–37)
BACTERIA URNS QL MICRO: 0 /HPF
BASOPHILS # BLD: 0 K/UL (ref 0–0.2)
BASOPHILS NFR BLD: 1 % (ref 0–2)
BILIRUB SERPL-MCNC: 0.7 MG/DL (ref 0.2–1.1)
BUN SERPL-MCNC: 16 MG/DL (ref 6–23)
CALCIUM SERPL-MCNC: 8.9 MG/DL (ref 8.3–10.4)
CASTS URNS QL MICRO: NORMAL /LPF
CHLORIDE SERPL-SCNC: 103 MMOL/L (ref 98–107)
CO2 SERPL-SCNC: 31 MMOL/L (ref 21–32)
CREAT SERPL-MCNC: 1.05 MG/DL (ref 0.8–1.5)
DIFFERENTIAL METHOD BLD: ABNORMAL
EOSINOPHIL # BLD: 0 K/UL (ref 0–0.8)
EOSINOPHIL NFR BLD: 1 % (ref 0.5–7.8)
EPI CELLS #/AREA URNS HPF: 0 /HPF
ERYTHROCYTE [DISTWIDTH] IN BLOOD BY AUTOMATED COUNT: 12.1 % (ref 11.9–14.6)
GLOBULIN SER CALC-MCNC: 3.7 G/DL (ref 2.3–3.5)
GLUCOSE SERPL-MCNC: 120 MG/DL (ref 65–100)
HCT VFR BLD AUTO: 42 % (ref 41.1–50.3)
HGB BLD-MCNC: 15.1 G/DL (ref 13.6–17.2)
IMM GRANULOCYTES # BLD: 0 K/UL (ref 0–0.5)
IMM GRANULOCYTES NFR BLD AUTO: 0 % (ref 0–5)
LIPASE SERPL-CCNC: 156 U/L (ref 73–393)
LYMPHOCYTES # BLD: 1.4 K/UL (ref 0.5–4.6)
LYMPHOCYTES NFR BLD: 36 % (ref 13–44)
MCH RBC QN AUTO: 31.8 PG (ref 26.1–32.9)
MCHC RBC AUTO-ENTMCNC: 36 G/DL (ref 31.4–35)
MCV RBC AUTO: 88.4 FL (ref 79.6–97.8)
MONOCYTES # BLD: 0.3 K/UL (ref 0.1–1.3)
MONOCYTES NFR BLD: 7 % (ref 4–12)
NEUTS SEG # BLD: 2.2 K/UL (ref 1.7–8.2)
NEUTS SEG NFR BLD: 55 % (ref 43–78)
PLATELET # BLD AUTO: 197 K/UL (ref 150–450)
PMV BLD AUTO: 8.1 FL (ref 10.8–14.1)
POTASSIUM SERPL-SCNC: 3.7 MMOL/L (ref 3.5–5.1)
PROT SERPL-MCNC: 7.2 G/DL (ref 6.3–8.2)
RBC # BLD AUTO: 4.75 M/UL (ref 4.23–5.67)
RBC #/AREA URNS HPF: NORMAL /HPF
SODIUM SERPL-SCNC: 140 MMOL/L (ref 136–145)
WBC # BLD AUTO: 3.9 K/UL (ref 4.3–11.1)
WBC URNS QL MICRO: NORMAL /HPF

## 2018-07-04 PROCEDURE — 83690 ASSAY OF LIPASE: CPT | Performed by: EMERGENCY MEDICINE

## 2018-07-04 PROCEDURE — 81015 MICROSCOPIC EXAM OF URINE: CPT | Performed by: EMERGENCY MEDICINE

## 2018-07-04 PROCEDURE — 81003 URINALYSIS AUTO W/O SCOPE: CPT | Performed by: EMERGENCY MEDICINE

## 2018-07-04 PROCEDURE — 99282 EMERGENCY DEPT VISIT SF MDM: CPT | Performed by: EMERGENCY MEDICINE

## 2018-07-04 PROCEDURE — 80053 COMPREHEN METABOLIC PANEL: CPT | Performed by: EMERGENCY MEDICINE

## 2018-07-04 PROCEDURE — 85025 COMPLETE CBC W/AUTO DIFF WBC: CPT | Performed by: EMERGENCY MEDICINE

## 2018-07-04 RX ORDER — ONDANSETRON 8 MG/1
8 TABLET, ORALLY DISINTEGRATING ORAL
Qty: 12 TAB | Refills: 1 | Status: SHIPPED | OUTPATIENT
Start: 2018-07-04 | End: 2018-11-20 | Stop reason: ALTCHOICE

## 2018-07-04 RX ORDER — ONDANSETRON 2 MG/ML
4 INJECTION INTRAMUSCULAR; INTRAVENOUS
Status: DISCONTINUED | OUTPATIENT
Start: 2018-07-04 | End: 2018-07-04 | Stop reason: HOSPADM

## 2018-07-04 NOTE — DISCHARGE INSTRUCTIONS
Alternate 4 ibuprofen every 8 hours with 2 extra-strength tylenol every 6 hours    Use afrin nasal spray, one spray to each nostril, every 12 hours, TWICE a day, for THREE days only, (then set it aside for 1 week)  1,200mg mucinex DM every 12 hours for a week. Try a sustained release sudafed every morning,  Drink plenty of fluids           Nausea and Vomiting: Care Instructions  Your Care Instructions    When you are nauseated, you may feel weak and sweaty and notice a lot of saliva in your mouth. Nausea often leads to vomiting. Most of the time you do not need to worry about nausea and vomiting, but they can be signs of other illnesses. Two common causes of nausea and vomiting are stomach flu and food poisoning. Nausea and vomiting from viral stomach flu will usually start to improve within 24 hours. Nausea and vomiting from food poisoning may last from 12 to 48 hours. The doctor has checked you carefully, but problems can develop later. If you notice any problems or new symptoms, get medical treatment right away. Follow-up care is a key part of your treatment and safety. Be sure to make and go to all appointments, and call your doctor if you are having problems. It's also a good idea to know your test results and keep a list of the medicines you take. How can you care for yourself at home? · To prevent dehydration, drink plenty of fluids, enough so that your urine is light yellow or clear like water. Choose water and other caffeine-free clear liquids until you feel better. If you have kidney, heart, or liver disease and have to limit fluids, talk with your doctor before you increase the amount of fluids you drink. · Rest in bed until you feel better. · When you are able to eat, try clear soups, mild foods, and liquids until all symptoms are gone for 12 to 48 hours. Other good choices include dry toast, crackers, cooked cereal, and gelatin dessert, such as Jell-O. When should you call for help?   Call [phone] anytime you think you may need emergency care. For example, call if:  ? · You passed out (lost consciousness). ?Call your doctor now or seek immediate medical care if:  ? · You have symptoms of dehydration, such as:  ¨ Dry eyes and a dry mouth. ¨ Passing only a little dark urine. ¨ Feeling thirstier than usual.   ? · You have new or worsening belly pain. ? · You have a new or higher fever. ? · You vomit blood or what looks like coffee grounds. ? Watch closely for changes in your health, and be sure to contact your doctor if:  ? · You have ongoing nausea and vomiting. ? · Your vomiting is getting worse. ? · Your vomiting lasts longer than 2 days. ? · You are not getting better as expected. Where can you learn more? Go to http://li-dalial.info/. Enter 25 [id number] in the search box to learn more about \"Nausea and Vomiting: Care Instructions. \"  Current as of: March 20, 2017  Content Version: 11.4  © 3043-4293 [employer]wise[employer]. Care instructions adapted under license by [employer] (which disclaims liability or warranty for this information). If you have questions about a medical condition or this instruction, always ask your healthcare professional. [address] any warranty or liability for your use of this information.

## 2018-07-04 NOTE — ED NOTES
I have reviewed discharge instructions with the patient. The patient verbalized understanding. Patient left ED via Discharge Method: ambulatory to Home with self    Opportunity for questions and clarification provided. Patient given 1 scripts. To continue your aftercare when you leave the hospital, you may receive an automated call from our care team to check in on how you are doing. This is a free service and part of our promise to provide the best care and service to meet your aftercare needs.  If you have questions, or wish to unsubscribe from this service please call [phone]. Thank you for Choosing our [facility] Emergency Department.

## 2018-07-06 NOTE — ED PROVIDER NOTES
HPI Comments: Threw up twice toady, has a head cold, ears hurt  No diarrhea  No fever    Patient is a 35 y.o. male presenting with vomiting. The history is provided by the patient. Vomiting    This is a new problem. Pertinent negatives include no chills, no fever, no abdominal pain, no diarrhea, no headaches, no arthralgias, no cough and no headaches. History reviewed. No pertinent past medical history. No past surgical history on file. No family history on file. Social History     Social History    Marital status: SINGLE     Spouse name: N/A    Number of children: N/A    Years of education: N/A     Occupational History    Not on file. Social History Main Topics    Smoking status: Never Smoker    Smokeless tobacco: Never Used    Alcohol use Yes      Comment: occa    Drug use: No    Sexual activity: Not on file     Other Topics Concern    Not on file     Social History Narrative         ALLERGIES: Review of patient's allergies indicates no known allergies. Review of Systems   Constitutional: Negative for chills and fever. HENT: Positive for congestion and ear pain. Negative for ear discharge, rhinorrhea and sore throat. Eyes: Negative for discharge and redness. Respiratory: Negative for cough and shortness of breath. Cardiovascular: Negative for chest pain. Gastrointestinal: Positive for nausea and vomiting. Negative for abdominal pain and diarrhea. Musculoskeletal: Negative for arthralgias and back pain. Skin: Negative for rash. Neurological: Negative for dizziness and headaches. Vitals:    07/04/18 1654   BP: 131/78   Pulse: 97   Resp: 18   Temp: 98.3 °F (36.8 °C)   SpO2: 97%   Weight: 54.4 kg (120 lb)   Height: 5' 10\" (1.778 m)            Physical Exam   Constitutional: He is oriented to person, place, and time. He appears well-developed and well-nourished. No distress. HENT:   Head: Normocephalic and atraumatic.    Eyes: Conjunctivae are normal. Pupils are equal, round, and reactive to light. Right eye exhibits no discharge. Left eye exhibits no discharge. No scleral icterus. Neck: Normal range of motion. Neck supple. Cardiovascular: Normal rate, regular rhythm and normal heart sounds. Exam reveals no gallop. No murmur heard. Pulmonary/Chest: Effort normal and breath sounds normal. No respiratory distress. He has no wheezes. He has no rales. Abdominal: Soft. Bowel sounds are normal. There is no tenderness. There is no guarding. Musculoskeletal: Normal range of motion. He exhibits no edema. Neurological: He is alert and oriented to person, place, and time. He exhibits normal muscle tone. cni 2-12 grossly   Skin: Skin is warm and dry. He is not diaphoretic. Psychiatric: He has a normal mood and affect. His behavior is normal.   Nursing note and vitals reviewed. MDM  Number of Diagnoses or Management Options  Non-intractable vomiting with nausea, unspecified vomiting type:   Viral upper respiratory infection:   Diagnosis management comments: Medical decision making note:  virus  This concludes the \"medical decision making note\" part of this emergency department visit note.           ED Course       Procedures

## 2018-11-20 ENCOUNTER — HOSPITAL ENCOUNTER (EMERGENCY)
Age: 33
Discharge: HOME | End: 2018-11-20
Attending: EMERGENCY MEDICINE
Payer: SELF-PAY

## 2018-11-20 VITALS
OXYGEN SATURATION: 96 % | DIASTOLIC BLOOD PRESSURE: 70 MMHG | HEART RATE: 70 BPM | RESPIRATION RATE: 18 BRPM | BODY MASS INDEX: 17.18 KG/M2 | TEMPERATURE: 98 F | SYSTOLIC BLOOD PRESSURE: 115 MMHG | HEIGHT: 70 IN | WEIGHT: 120 LBS

## 2018-11-20 DIAGNOSIS — M54.6 ACUTE THORACIC BACK PAIN, UNSPECIFIED BACK PAIN LATERALITY: Primary | ICD-10-CM

## 2018-11-20 PROCEDURE — 74011250637 HC RX REV CODE- 250/637: Performed by: EMERGENCY MEDICINE

## 2018-11-20 PROCEDURE — 99283 EMERGENCY DEPT VISIT LOW MDM: CPT | Performed by: EMERGENCY MEDICINE

## 2018-11-20 RX ORDER — NAPROXEN 250 MG/1
500 TABLET ORAL
Status: COMPLETED | OUTPATIENT
Start: 2018-11-20 | End: 2018-11-20

## 2018-11-20 RX ORDER — CYCLOBENZAPRINE HCL 10 MG
10 TABLET ORAL
Status: COMPLETED | OUTPATIENT
Start: 2018-11-20 | End: 2018-11-20

## 2018-11-20 RX ORDER — CYCLOBENZAPRINE HCL 10 MG
10 TABLET ORAL
Qty: 9 TAB | Refills: 0 | Status: SHIPPED | OUTPATIENT
Start: 2018-11-20 | End: 2019-01-01 | Stop reason: CLARIF

## 2018-11-20 RX ADMIN — NAPROXEN 500 MG: 250 TABLET ORAL at 20:43

## 2018-11-20 RX ADMIN — CYCLOBENZAPRINE HYDROCHLORIDE 10 MG: 10 TABLET, FILM COATED ORAL at 20:42

## 2018-11-20 NOTE — ED TRIAGE NOTES
Pt c/o lower back pain. 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

## 2018-11-21 NOTE — ED NOTES
I have reviewed discharge instructions with the patient. The patient verbalized understanding. Patient left ED via Discharge Method: ambulatory to Home with self. Opportunity for questions and clarification provided. Patient given 1 scripts. To continue your aftercare when you leave the hospital, you may receive an automated call from our care team to check in on how you are doing. This is a free service and part of our promise to provide the best care and service to meet your aftercare needs.  If you have questions, or wish to unsubscribe from this service please call [phone]. Thank you for Choosing our [facility] Emergency Department.

## 2018-11-21 NOTE — ED PROVIDER NOTES
[facility] Emergency Department Yash Hopson is a 35 y.o. male seen on 11/20/2018 at 8:31 PM in the [facility] EMERGENCY DEPT in room HD/D. Chief Complaint Patient presents with  Back Pain HPI: Will pain 80-year-old male sitting up on the stretcher. Complains of thoracic back pain. Worse with movement and activity No alleviating No falls traumas accidents No fever or chills. Patient recently started working a [employer]. 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 Increased pain with that activity. Back Pain Pertinent negatives include no fever. Review of Systems: Review of Systems Constitutional: Negative for chills and fever. Respiratory: Negative. Genitourinary: Negative. Musculoskeletal: Positive for back pain. Neurological: Negative. Psychiatric/Behavioral: Negative. Past Medical History: Primary Care Doctor: None History reviewed. No pertinent past medical history. History reviewed. No pertinent surgical history. Social History Socioeconomic History  Marital status: SINGLE Spouse name: Not on file  Number of children: Not on file  Years of education: Not on file  Highest education level: Not on file Tobacco Use  Smoking status: Never Smoker  Smokeless tobacco: Never Used Substance and Sexual Activity  Alcohol use: Yes Comment: occa  Drug use: No  
 
None No Known Allergies Physical Exam:  Nursing documentation reviewed. Vitals:  
 11/20/18 1842 BP: 108/71 Pulse: 67 Resp: 17 Temp: 98.2 °F (36.8 °C) SpO2: 99% Vital signs were reviewed. Physical Exam  
Constitutional: He is oriented to person, place, and time. He appears well-developed and well-nourished. HENT:  
Head: Atraumatic. Abdominal: Bowel sounds are normal.  
Musculoskeletal: Normal range of motion. Back: 
 
Neurological: He is alert and oriented to person, place, and time. Skin: Skin is warm and dry. Psychiatric: He has a normal mood and affect. His behavior is normal.  
Nursing note and vitals reviewed. Medical Decision Making: MDM Number of Diagnoses or Management Options Diagnosis management comments: 28-year-old male with thoracic back pain associated with lifting at work Start anti-inflammatories I don't think imaging is helpful as patient has no specific trauma. No incontinence. No fever. No IV drug use. No neurologic changes. ______________________________________________________________________ Tia Yu MD; 11/20/2018 @8:31 PM===========================================

## 2019-01-01 ENCOUNTER — HOSPITAL ENCOUNTER (EMERGENCY)
Age: 34
Discharge: HOME | End: 2019-01-01
Attending: EMERGENCY MEDICINE
Payer: SELF-PAY

## 2019-01-01 VITALS
HEART RATE: 68 BPM | SYSTOLIC BLOOD PRESSURE: 121 MMHG | OXYGEN SATURATION: 99 % | RESPIRATION RATE: 16 BRPM | TEMPERATURE: 98 F | BODY MASS INDEX: 17.18 KG/M2 | HEIGHT: 70 IN | WEIGHT: 120 LBS | DIASTOLIC BLOOD PRESSURE: 71 MMHG

## 2019-01-01 DIAGNOSIS — K64.9 HEMORRHOIDS, UNSPECIFIED HEMORRHOID TYPE: Primary | ICD-10-CM

## 2019-01-01 PROCEDURE — 99283 EMERGENCY DEPT VISIT LOW MDM: CPT | Performed by: EMERGENCY MEDICINE

## 2019-01-01 NOTE — DISCHARGE INSTRUCTIONS
Hemorrhoids: Care Instructions  Your Care Instructions    Hemorrhoids are enlarged veins that develop in the anal canal. Bleeding during bowel movements, itching, swelling, and rectal pain are the most common symptoms. They can be uncomfortable at times, but hemorrhoids rarely are a serious problem. You can treat most hemorrhoids with simple changes to your diet and bowel habits. These changes include eating more fiber and not straining to pass stools. Most hemorrhoids do not need surgery or other treatment unless they are very large and painful or bleed a lot. Follow-up care is a key part of your treatment and safety. Be sure to make and go to all appointments, and call your doctor if you are having problems. It's also a good idea to know your test results and keep a list of the medicines you take. How can you care for yourself at home? · Sit in a few inches of warm water (sitz bath) 3 times a day and after bowel movements. The warm water helps with pain and itching. · Put ice on your anal area several times a day for 10 minutes at a time. Put a thin cloth between the ice and your skin. Follow this by placing a warm, wet towel on the area for another 10 to 20 minutes. · Take pain medicines exactly as directed. ? If the doctor gave you a prescription medicine for pain, take it as prescribed. ? If you are not taking a prescription pain medicine, ask your doctor if you can take an over-the-counter medicine. · Keep the anal area clean, but be gentle. Use water and a fragrance-free soap, such as [place], or use baby wipes or medicated pads, such as Tucks. · Wear cotton underwear and loose clothing to decrease moisture in the anal area. · Eat more fiber. Include foods such as whole-grain breads and cereals, raw vegetables, raw and dried fruits, and beans. · Drink plenty of fluids, enough so that your urine is light yellow or clear like water.  If you have kidney, heart, or liver disease and have to limit fluids, talk with your doctor before you increase the amount of fluids you drink. · Use a stool softener that contains bran or psyllium. You can save money by buying bran or psyllium (available in bulk at most health food stores) and sprinkling it on foods or stirring it into fruit juice. Or you can use a product such as Metamucil or Hydrocil. · Practice healthy bowel habits. ? Go to the bathroom as soon as you have the urge. ? Avoid straining to pass stools. Relax and give yourself time to let things happen naturally. ? Do not hold your breath while passing stools. ? Do not read while sitting on the toilet. Get off the toilet as soon as you have finished. · Take your medicines exactly as prescribed. Call your doctor if you think you are having a problem with your medicine. When should you call for help? Call 911 anytime you think you may need emergency care. For example, call if:    · You pass maroon or very bloody stools.    Call your doctor now or seek immediate medical care if:    · You have increased pain.     · You have increased bleeding.    Watch closely for changes in your health, and be sure to contact your doctor if:    · Your symptoms have not improved after 3 or 4 days. Where can you learn more? Go to http://li-dalila.info/. Enter F228 in the search box to learn more about \"Hemorrhoids: Care Instructions. \"  Current as of: March 28, 2018  Content Version: 11.8  © 7396-9911 [employer]. Care instructions adapted under license by [employer] (which disclaims liability or warranty for this information). If you have questions about a medical condition or this instruction, always ask your healthcare professional. [place] [address] any warranty or liability for your use of this information.

## 2019-01-01 NOTE — ED NOTES
I have reviewed discharge instructions with the patient. The patient verbalized understanding. Patient left ED via Discharge Method: ambulatory to Home with (self). Opportunity for questions and clarification provided. Patient given 1 scripts. To continue your aftercare when you leave the hospital, you may receive an automated call from our care team to check in on how you are doing. This is a free service and part of our promise to provide the best care and service to meet your aftercare needs.  If you have questions, or wish to unsubscribe from this service please call [phone]. Thank you for Choosing our [facility] Emergency Department.

## 2019-01-01 NOTE — ED PROVIDER NOTES
Patient is a 68-year-old male who states he's been dealing with hemorrhoids for a long time. They affect him off and on. He feels like they drop down yesterday and did not go back in. He had a little blood on the toilet paper. Pain is 8 out of 10 and constant. He is not taking any medication for it. Hemorrhoids Pertinent negatives include no abdominal pain, no chills, no fever, no nausea, no vomiting and no diarrhea. History reviewed. No pertinent past medical history. History reviewed. No pertinent surgical history. History reviewed. No pertinent family history. Social History Socioeconomic History  Marital status: SINGLE Spouse name: Not on file  Number of children: Not on file  Years of education: Not on file  Highest education level: Not on file Social Needs  Financial resource strain: Not on file  Food insecurity - worry: Not on file  Food insecurity - inability: Not on file  Transportation needs - medical: Not on file  Transportation needs - non-medical: Not on file Occupational History  Not on file Tobacco Use  Smoking status: Never Smoker  Smokeless tobacco: Never Used Substance and Sexual Activity  Alcohol use: Yes Comment: occa  Drug use: No  
 Sexual activity: Not on file Other Topics Concern  Not on file Social History Narrative  Not on file ALLERGIES: Patient has no known allergies. Review of Systems Constitutional: Negative for chills and fever. Respiratory: Negative for cough, shortness of breath and wheezing. Gastrointestinal: Positive for anal bleeding and hemorrhoids. Negative for abdominal pain, diarrhea, nausea and vomiting. Skin: Negative for color change, pallor and wound. Neurological: Negative for weakness and numbness. All other systems reviewed and are negative. Vitals:  
 01/01/19 0753 BP: 113/75 Pulse: 62 Resp: 16 Temp: 98.5 °F (36.9 °C) SpO2: 99% Weight: 54.4 kg (120 lb) Height: 5' 10\" (1.778 m) Physical Exam  
Constitutional: He appears well-developed and well-nourished. No distress. HENT:  
Head: Normocephalic and atraumatic. Eyes: Conjunctivae are normal. Pupils are equal, round, and reactive to light. No scleral icterus. Neck: No tracheal deviation present. Pulmonary/Chest: Effort normal. No respiratory distress. Abdominal: Soft. He exhibits no distension and no mass. There is no tenderness. There is no rebound and no guarding. Genitourinary:  
Genitourinary Comments: Soft external hemorrhoid. No active bleeding. Moderately tender to palpation over hemorrhoid. Neurological: He is alert. Skin: He is not diaphoretic. Psychiatric: He has a normal mood and affect. His behavior is normal.  
Nursing note and vitals reviewed. MDM Number of Diagnoses or Management Options Hemorrhage:  
Diagnosis management comments: Patient has an external hemorrhoid I am discharging with symptomatic care and GI followup. Elvi Bojorquez MD; 1/1/2019 @10:15 AM Voice dictation software was used during the making of this note. This software is not perfect and grammatical and other typographical errors may be present. This note has not been proofread for errors. 
===================================================================  
 
  
 
Procedures

## 2019-01-01 NOTE — LETTER
[facility] EMERGENCY DEPT 
[address] [place] [id number] [phone] Work/School Note Date: 1/1/2019 To Whom It May concern: Harleen Matos was seen and treated today in the emergency room by the following provider(s): 
Attending Provider: Jani Small MD. Harleen Matos may return to work on 1-2-19.  
 
Sincerely, 
 
 
 
 
Magaly Cherry RN

## 2019-01-31 ENCOUNTER — HOSPITAL ENCOUNTER (EMERGENCY)
Age: 34
Discharge: HOME | End: 2019-01-31
Attending: EMERGENCY MEDICINE
Payer: SELF-PAY

## 2019-01-31 VITALS
DIASTOLIC BLOOD PRESSURE: 75 MMHG | RESPIRATION RATE: 20 BRPM | HEART RATE: 78 BPM | OXYGEN SATURATION: 99 % | SYSTOLIC BLOOD PRESSURE: 120 MMHG | WEIGHT: 120 LBS | TEMPERATURE: 98.7 F | HEIGHT: 68 IN | BODY MASS INDEX: 18.19 KG/M2

## 2019-01-31 DIAGNOSIS — J10.1 INFLUENZA A: Primary | ICD-10-CM

## 2019-01-31 LAB
FLUAV AG NPH QL IA: POSITIVE
FLUBV AG NPH QL IA: NEGATIVE
SPECIMEN SOURCE: ABNORMAL

## 2019-01-31 PROCEDURE — 87804 INFLUENZA ASSAY W/OPTIC: CPT

## 2019-01-31 PROCEDURE — 99283 EMERGENCY DEPT VISIT LOW MDM: CPT | Performed by: EMERGENCY MEDICINE

## 2019-01-31 NOTE — ED PROVIDER NOTES
17-year-old male presents with flulike symptoms for the past 3 days. His children had the flu over the weekend. He reports mild headache, congestion, cough productive of white sputum, nausea without vomiting. He denies abdominal pain or diarrhea. No urinary symptoms. He has had subjective fevers at home. He has been taking Mucinex. The history is provided by the patient. History reviewed. No pertinent past medical history. History reviewed. No pertinent surgical history. History reviewed. No pertinent family history. Social History     Socioeconomic History    Marital status: SINGLE     Spouse name: Not on file    Number of children: Not on file    Years of education: Not on file    Highest education level: Not on file   Social Needs    Financial resource strain: Not on file    Food insecurity - worry: Not on file    Food insecurity - inability: Not on file    Transportation needs - medical: Not on file   [employer] needs - non-medical: Not on file   Occupational History    Not on file   Tobacco Use    Smoking status: Never Smoker    Smokeless tobacco: Never Used   Substance and Sexual Activity    Alcohol use: Yes     Comment: occa    Drug use: No    Sexual activity: Not on file   Other Topics Concern    Not on file   Social History Narrative    Not on file         ALLERGIES: Patient has no known allergies. Review of Systems   Constitutional: Positive for appetite change, chills, diaphoresis, fatigue and fever. HENT: Positive for congestion and sinus pressure. Respiratory: Positive for cough. Negative for shortness of breath. Cardiovascular: Negative for chest pain. Gastrointestinal: Positive for nausea. Negative for abdominal pain, diarrhea and vomiting. Genitourinary: Negative for dysuria. Musculoskeletal: Positive for arthralgias and myalgias. Skin: Negative for rash. Neurological: Positive for headaches.    Psychiatric/Behavioral: Negative for confusion. Vitals:    01/31/19 0143   BP: 121/79   Pulse: 78   Resp: 20   Temp: 98.7 °F (37.1 °C)   SpO2: 99%   Weight: 54.4 kg (120 lb)   Height: 5' 8\" (1.727 m)            Physical Exam   Constitutional: He appears well-developed and well-nourished. HENT:   Head: Normocephalic and atraumatic. Right Ear: External ear normal.   Left Ear: External ear normal.   Nose: Nose normal.   Mouth/Throat: Oropharynx is clear and moist.   Eyes: Conjunctivae are normal. Pupils are equal, round, and reactive to light. Neck: Normal range of motion. Neck supple. Cardiovascular: Regular rhythm, normal heart sounds and intact distal pulses. Pulmonary/Chest: Effort normal and breath sounds normal. No respiratory distress. He has no wheezes. Abdominal: Soft. Bowel sounds are normal. He exhibits no distension. There is no tenderness. Musculoskeletal: Normal range of motion. He exhibits no edema. Neurological: He is alert. Skin: Skin is warm and dry. Psychiatric: Judgment normal.   Nursing note and vitals reviewed. MDM  Number of Diagnoses or Management Options  Influenza A:   Diagnosis management comments: Parts of this document were created using dragon voice recognition software. The chart has been reviewed but errors may still be present. Positive flu. No indication for Tamiflu. Well-appearing. I discussed the results of all labs, procedures, radiographs, and treatments with the patient and available family. Treatment plan is agreed upon and the patient is ready for discharge. Questions about treatment in the ED and differential diagnosis of presenting condition were answered. Patient was given verbal discharge instructions including, but not limited to, importance of returning to the emergency department for any concern of worsening or continued symptoms. Instructions were given to follow up with a primary care provider or specialist within 1-2 days.   Adverse effects of medications, if prescribed, were discussed and patient was advised to refrain from significant physical activity until followed up by primary care physician and to not drive or operate heavy machinery after taking any sedating substances.            Amount and/or Complexity of Data Reviewed  Clinical lab tests: ordered and reviewed (Results for orders placed or performed during the hospital encounter of 01/31/19  -INFLUENZA A & B AG (RAPID TEST)       Result                      Value             Ref Range           Influenza A Ag              POSITIVE (A)      NEG                 Influenza B Ag              NEGATIVE          NEG                 Source                                                        NASOPHARYNGEAL  )           Procedures

## 2019-01-31 NOTE — DISCHARGE INSTRUCTIONS
Push fluids. Alternate Tylenol and Motrin for fever and body aches. Take Mucinex. Return for worsening or concerning symptoms. You may return to work when she was fever free for 24 hours.

## 2019-01-31 NOTE — LETTER
[address] [facility] EMERGENCY DEPT 
[address] [place] [id number] [phone] Work/School Note Date: 1/31/2019 To Whom It May concern: James Foley was seen and treated today in the emergency room by the following provider(s): 
Attending Provider: Paulie Brown MD. James Foley may return to work on 2/2/19. Sincerely, Marito Jacob MD

## 2019-01-31 NOTE — ED NOTES
I have reviewed discharge instructions with the patient. The patient verbalized understanding. Patient left ED via Discharge Method: ambulatory to Home with by himself. Opportunity for questions and clarification provided. Patient given 0 scripts. To continue your aftercare when you leave the hospital, you may receive an automated call from our care team to check in on how you are doing. This is a free service and part of our promise to provide the best care and service to meet your aftercare needs.  If you have questions, or wish to unsubscribe from this service please call [phone]. Thank you for Choosing our [facility] Emergency Department.

## 2022-06-19 ENCOUNTER — HOSPITAL ENCOUNTER (EMERGENCY)
Age: 37
Discharge: HOME | End: 2022-06-19
Attending: EMERGENCY MEDICINE
Payer: MEDICAID

## 2022-06-19 ENCOUNTER — HOSPITAL ENCOUNTER (EMERGENCY)
Dept: GENERAL RADIOLOGY | Age: 37
Discharge: HOME | End: 2022-06-22
Payer: MEDICAID

## 2022-06-19 VITALS
DIASTOLIC BLOOD PRESSURE: 74 MMHG | OXYGEN SATURATION: 99 % | WEIGHT: 120 LBS | RESPIRATION RATE: 16 BRPM | BODY MASS INDEX: 17.18 KG/M2 | HEART RATE: 72 BPM | HEIGHT: 70 IN | TEMPERATURE: 98.3 F | SYSTOLIC BLOOD PRESSURE: 120 MMHG

## 2022-06-19 DIAGNOSIS — M54.9 UPPER BACK PAIN: ICD-10-CM

## 2022-06-19 DIAGNOSIS — M54.50 ACUTE MIDLINE LOW BACK PAIN WITHOUT SCIATICA: Primary | ICD-10-CM

## 2022-06-19 PROCEDURE — 72072 X-RAY EXAM THORAC SPINE 3VWS: CPT

## 2022-06-19 PROCEDURE — 96372 THER/PROPH/DIAG INJ SC/IM: CPT

## 2022-06-19 PROCEDURE — 72100 X-RAY EXAM L-S SPINE 2/3 VWS: CPT

## 2022-06-19 PROCEDURE — 6360000002 HC RX W HCPCS: Performed by: NURSE PRACTITIONER

## 2022-06-19 PROCEDURE — 6370000000 HC RX 637 (ALT 250 FOR IP): Performed by: NURSE PRACTITIONER

## 2022-06-19 PROCEDURE — 99284 EMERGENCY DEPT VISIT MOD MDM: CPT

## 2022-06-19 RX ORDER — KETOROLAC TROMETHAMINE 15 MG/ML
15 INJECTION, SOLUTION INTRAMUSCULAR; INTRAVENOUS
Status: COMPLETED | OUTPATIENT
Start: 2022-06-19 | End: 2022-06-19

## 2022-06-19 RX ORDER — ACETAMINOPHEN 500 MG
1000 TABLET ORAL
Status: COMPLETED | OUTPATIENT
Start: 2022-06-19 | End: 2022-06-19

## 2022-06-19 RX ORDER — IBUPROFEN 600 MG/1
600 TABLET ORAL EVERY 8 HOURS PRN
Qty: 12 TABLET | Refills: 0 | Status: SHIPPED | OUTPATIENT
Start: 2022-06-19 | End: 2022-06-23

## 2022-06-19 RX ORDER — CYCLOBENZAPRINE HCL 5 MG
5 TABLET ORAL 2 TIMES DAILY PRN
Qty: 10 TABLET | Refills: 0 | Status: SHIPPED | OUTPATIENT
Start: 2022-06-19 | End: 2022-06-24

## 2022-06-19 RX ORDER — LIDOCAINE 4 G/G
1 PATCH TOPICAL
Status: DISCONTINUED | OUTPATIENT
Start: 2022-06-19 | End: 2022-06-19 | Stop reason: HOSPADM

## 2022-06-19 RX ADMIN — KETOROLAC TROMETHAMINE 15 MG: 15 INJECTION, SOLUTION INTRAMUSCULAR; INTRAVENOUS at 13:30

## 2022-06-19 RX ADMIN — ACETAMINOPHEN 1000 MG: 500 TABLET, FILM COATED ORAL at 13:30

## 2022-06-19 ASSESSMENT — PAIN DESCRIPTION - DESCRIPTORS: DESCRIPTORS: ACHING;SHARP;SHOOTING

## 2022-06-19 ASSESSMENT — PAIN - FUNCTIONAL ASSESSMENT: PAIN_FUNCTIONAL_ASSESSMENT: 0-10

## 2022-06-19 ASSESSMENT — PAIN SCALES - GENERAL
PAINLEVEL_OUTOF10: 9
PAINLEVEL_OUTOF10: 8

## 2022-06-19 ASSESSMENT — PAIN DESCRIPTION - LOCATION: LOCATION: BACK

## 2022-06-19 ASSESSMENT — PAIN DESCRIPTION - PAIN TYPE: TYPE: ACUTE PAIN

## 2022-06-19 ASSESSMENT — PAIN DESCRIPTION - FREQUENCY: FREQUENCY: CONTINUOUS

## 2022-06-19 NOTE — ED TRIAGE NOTES
Pt c/o midline back pain with shoulder discomfort mid to lower back x3days.  Pain is nonradiating to extremities (-)numbness/tingling, fevers, cp, dyspnea, dysuria  A&OX4

## 2022-06-19 NOTE — ED PROVIDER NOTES
[facility] Emergency Department Provider Note                   PCP:                None Provider               Age: 40 y.o. Sex: male     No diagnosis found. DISPOSITION         New Prescriptions    No medications on file       Orders Placed This Encounter   Procedures    XR THORACIC SPINE (3 VIEWS)    XR LUMBAR SPINE (2-3 VIEWS)        Sandeep Wilkins NP, APRN - CNP 1:10 PM      MDM   Injured back lifting boxes at work, late last week. Has constant pain, made worse with position change and sitting. History of back pain in the past.  No numbness/tingling/weakness. Denies other concerning feature. No radiation of pain or incontinence. No recent illness, fevers or chills, night sweats. Pleasant and well-appearing appears no acute distress. No known therapeutic measures. Nothing else known to make worse or better. A broad differential of diagnoses has been considered for evaluation of this patient's back pain. This includes but is not limited to: acute vascular emergencies such as aortic dissection and aortic anneurysm rupture, cauda equina syndrome, spinal / epidural abscess, pneumothorax, pyelonephritis, obstructive urolithiasis, muscle strain and disc herniation   Conditions that are considered to have sufficiently low pre-test probability after history and physical exam will receive no additional laboratory, imaging or invasive workup urgently in the Emergency Department. Low suspicion at this point for acute infectious, orthopedic, neurologic or spinal cord emergencies. Low suspicion for cardiovascular emergencies at this time. Plan for supportive care measures. Patient is afebrile, hemodynamically stable and in no acute distress. Patient is not ill-appearing. All findings and plans were discussed with the patient. Patient verbalizes desire to be discharged home at this time. All questions answered.  Discussed with the patient that an unremarkable evaluation in the ED does not preclude the development or presence of a serious or life threatening condition. Patient was instructed to return immediately for any worsening or change in current symptoms, or if symptoms do not continue to improve. I instructed them to follow up with their primary care provider, own specialist, or medical provider that I am recommending for him within the next 2-3 days   The patient acknowledged understanding plan of care and affirmed approval. Patient is discharged home, with no further complaint. Disposition: Discharged           Signed by: CARTER Worley       Irina George is a 40 y.o. male who presents to the Emergency Department with chief complaint of    Chief Complaint   Patient presents with    Back Pain      HPI  54-year-old female presents to the ED with complaint of thoracic and lumbar back pain x2-3 days. States that he injured himself while lifting heavy cases while at work in preceding days. Localized to the midline thoracic or lumbar region. Pain is constant and made worse with movement and upright. Pain does not radiate. Denies numbness/tingling/weakness, gait change, incontinence, difficulty into bladder or bowels, saddle anesthesia, rectal sphincter laxity, gait change, chest pain, difficulty breathing, cough, recent illness, recent  Unplanned weight Loss, history of spinal abscess, spinal surgery, IV drug use with a concerning feature. No other complaints. Alert and orient x4, hemodynamic stable. Ambulatory with normal tandem gait and station. Review of Systems  Constitutional: Negative for fever. Negative for appetite change, chills, diaphoresis and unexpected weight change. HENT: Negative     Eyes: Negative   Respiratory: Negative  Cardiovascular: Negative  Musculoskeletal: As in HPI  Skin: Negative     Allergic/Immunologic: Negative  Neurological: Negative                          History reviewed. No pertinent past medical history. History reviewed.  No pertinent surgical history. History reviewed. No pertinent family history. Social Connections:     Frequency of Communication with Friends and Family: Not on file    Frequency of Social Gatherings with Friends and Family: Not on file    Attends Judaism Services: Not on file    Active Member of Clubs or Organizations: Not on file    Attends Club or Organization Meetings: Not on file    Marital Status: Not on file        No Known Allergies     Vitals signs and nursing note reviewed. Patient Vitals for the past 4 hrs:   Temp Pulse Resp BP SpO2   06/19/22 1253 98.3 °F (36.8 °C) 72 16 120/74 99 %          Physical Exam   Constitutional: Oriented to person, place, and time. Appears well-developed and well-nourished. No distress. HENT:    Head: Normocephalic and atraumatic  Right Ear: External ear normal.    Left Ear: External ear normal.     Nose: Nose normal.   Mouth/Throat: Mouth normal.    Eyes: Conjunctivae are normal. Pupils are equal, round, and reactive to light. Neck: Supple. No tracheal deviation. Cardiovascular: Normal rate, intact distal pulses. Brisk capillary refill intact, less than 2 seconds. Regular rhythm present. Pulmonary/Chest: Lungs are equal bilaterally. No respiratory distress. Abdominal: Soft. There is no tenderness. Musculoskeletal: Back: No bruising, no swelling, no deformity. Vague report of generalized lumbar tenderness, to include midline. Normal active range of motion, but with report of increased back pain. No pain with passive ROM. No pain with internal/external rotation of the hips bilaterally. No edema, instability, crepitus, or deformity. Neurological: Alert and oriented to person, place, and time. Normal muscle tone. Coordination normal. GCS= 15. Sensation: Intact and symmetric from L2 - S1 bilaterally. Brisk reflexes present, 2/2, bilateral lower extremities. Negative clonus at the ankles.  Negative SLR. 5/5 strength and intact of lower extremities, bilaterally. Normal gait - no difficulty with Tandem gait. No saddle anesthesia. No incontinence. Skin: Skin is warm and dry. Capillary refill takes less than 2 seconds. No abrasion, no lesion, no petechiae and no rash noted. Not diaphoretic. No cyanosis, erythema, or pallor. Psychiatric: Normal mood and affect. Behavior is normal.    Nursing note and vitals reviewed. Procedures      Labs Reviewed - No data to display     XR THORACIC SPINE (3 VIEWS)    (Results Pending)   XR LUMBAR SPINE (2-3 VIEWS)    (Results Pending)                          Voice dictation software was used during the making of this note. This software is not perfect and grammatical and other typographical errors may be present. This note has not been completely proofread for errors.      KULWANT Erickson - CNP  06/19/22 [id number]

## 2022-06-19 NOTE — ED NOTES
I have reviewed discharge instructions with the patient. The patient verbalized understanding. Patient left ED via Discharge Method: ambulatory to Home by self    Opportunity for questions and clarification provided. Patient given 2 scripts. To continue your aftercare when you leave the hospital, you may receive an automated call from our care team to check in on how you are doing. This is a free service and part of our promise to provide the best care and service to meet your aftercare needs.  If you have questions, or wish to unsubscribe from this service please call [phone]. Thank you for Choosing our [facility] Emergency Department.       Macie Tiwari RN  06/19/22 [id number]

## 2022-06-26 ENCOUNTER — HOSPITAL ENCOUNTER (EMERGENCY)
Age: 37
Discharge: HOME | End: 2022-06-26
Attending: EMERGENCY MEDICINE
Payer: MEDICAID

## 2022-06-26 ENCOUNTER — HOSPITAL ENCOUNTER (EMERGENCY)
Dept: CT IMAGING | Age: 37
Discharge: HOME | End: 2022-06-29
Payer: MEDICAID

## 2022-06-26 VITALS
SYSTOLIC BLOOD PRESSURE: 129 MMHG | TEMPERATURE: 98 F | BODY MASS INDEX: 17.18 KG/M2 | OXYGEN SATURATION: 98 % | HEIGHT: 70 IN | HEART RATE: 80 BPM | WEIGHT: 120 LBS | DIASTOLIC BLOOD PRESSURE: 78 MMHG | RESPIRATION RATE: 17 BRPM

## 2022-06-26 DIAGNOSIS — M54.9 UPPER BACK PAIN: Primary | ICD-10-CM

## 2022-06-26 DIAGNOSIS — S46.819A STRAIN OF TRAPEZIUS MUSCLE, UNSPECIFIED LATERALITY, INITIAL ENCOUNTER: ICD-10-CM

## 2022-06-26 PROCEDURE — 6360000002 HC RX W HCPCS: Performed by: NURSE PRACTITIONER

## 2022-06-26 PROCEDURE — 72128 CT CHEST SPINE W/O DYE: CPT

## 2022-06-26 PROCEDURE — 99284 EMERGENCY DEPT VISIT MOD MDM: CPT

## 2022-06-26 PROCEDURE — 96372 THER/PROPH/DIAG INJ SC/IM: CPT

## 2022-06-26 PROCEDURE — 6370000000 HC RX 637 (ALT 250 FOR IP): Performed by: NURSE PRACTITIONER

## 2022-06-26 RX ORDER — PREDNISONE 10 MG/1
40 TABLET ORAL DAILY
Status: DISCONTINUED | OUTPATIENT
Start: 2022-06-26 | End: 2022-06-26 | Stop reason: HOSPADM

## 2022-06-26 RX ORDER — MELOXICAM 7.5 MG/1
7.5 TABLET ORAL DAILY
Qty: 4 TABLET | Refills: 0 | Status: SHIPPED | OUTPATIENT
Start: 2022-06-26 | End: 2022-06-30

## 2022-06-26 RX ORDER — LIDOCAINE 4 G/G
1 PATCH TOPICAL DAILY
Qty: 5 PATCH | Refills: 0 | Status: SHIPPED | OUTPATIENT
Start: 2022-06-26 | End: 2022-07-01

## 2022-06-26 RX ORDER — METHOCARBAMOL 750 MG/1
1500 TABLET, FILM COATED ORAL
Status: COMPLETED | OUTPATIENT
Start: 2022-06-26 | End: 2022-06-26

## 2022-06-26 RX ORDER — KETOROLAC TROMETHAMINE 15 MG/ML
15 INJECTION, SOLUTION INTRAMUSCULAR; INTRAVENOUS
Status: COMPLETED | OUTPATIENT
Start: 2022-06-26 | End: 2022-06-26

## 2022-06-26 RX ORDER — LIDOCAINE 4 G/G
1 PATCH TOPICAL
Status: DISCONTINUED | OUTPATIENT
Start: 2022-06-26 | End: 2022-06-26 | Stop reason: HOSPADM

## 2022-06-26 RX ORDER — METHOCARBAMOL 500 MG/1
500 TABLET, FILM COATED ORAL 3 TIMES DAILY PRN
Qty: 12 TABLET | Refills: 0 | Status: SHIPPED | OUTPATIENT
Start: 2022-06-26 | End: 2022-06-30

## 2022-06-26 RX ORDER — ACETAMINOPHEN 500 MG
1000 TABLET ORAL
Status: COMPLETED | OUTPATIENT
Start: 2022-06-26 | End: 2022-06-26

## 2022-06-26 RX ORDER — PREDNISONE 10 MG/1
40 TABLET ORAL
Qty: 16 TABLET | Refills: 0 | Status: SHIPPED | OUTPATIENT
Start: 2022-06-26 | End: 2022-06-30

## 2022-06-26 RX ADMIN — PREDNISONE 40 MG: 10 TABLET ORAL at 09:05

## 2022-06-26 RX ADMIN — ACETAMINOPHEN 1000 MG: 500 TABLET, FILM COATED ORAL at 09:04

## 2022-06-26 RX ADMIN — KETOROLAC TROMETHAMINE 15 MG: 15 INJECTION, SOLUTION INTRAMUSCULAR; INTRAVENOUS at 09:04

## 2022-06-26 RX ADMIN — METHOCARBAMOL 1500 MG: 750 TABLET ORAL at 09:05

## 2022-06-26 ASSESSMENT — PAIN - FUNCTIONAL ASSESSMENT
PAIN_FUNCTIONAL_ASSESSMENT: ACTIVITIES ARE NOT PREVENTED

## 2022-06-26 ASSESSMENT — PAIN DESCRIPTION - ORIENTATION
ORIENTATION: UPPER

## 2022-06-26 ASSESSMENT — PAIN SCALES - GENERAL
PAINLEVEL_OUTOF10: 4
PAINLEVEL_OUTOF10: 10
PAINLEVEL_OUTOF10: 10

## 2022-06-26 ASSESSMENT — PAIN DESCRIPTION - LOCATION
LOCATION: BACK

## 2022-06-26 ASSESSMENT — PAIN DESCRIPTION - DESCRIPTORS
DESCRIPTORS: ACHING;THROBBING;SHOOTING
DESCRIPTORS: ACHING;THROBBING;SHOOTING
DESCRIPTORS: ACHING

## 2022-06-26 ASSESSMENT — PAIN DESCRIPTION - ONSET: ONSET: PROGRESSIVE

## 2022-06-26 ASSESSMENT — PAIN DESCRIPTION - PAIN TYPE: TYPE: ACUTE PAIN

## 2022-06-26 ASSESSMENT — PAIN DESCRIPTION - FREQUENCY: FREQUENCY: CONTINUOUS

## 2022-06-26 NOTE — ED TRIAGE NOTES
Pt here with back pain last week. Pt states that the pain has not improved. Pt states that the pain now radiates from the top to the bottom and is worse with movement.

## 2022-06-26 NOTE — Clinical Note
Ember Hutchinson was seen and treated in our emergency department on 6/26/2022. Please place on light duty status until cleared to return by orthopedics or primary care.     Juan Chang, KULWANT - CNP

## 2022-06-26 NOTE — ED PROVIDER NOTES
[facility] Emergency Department Provider Note                   PCP:                None Provider               Age: 40 y.o. Sex: male       ICD-10-CM    1. Upper back pain  M54.9    2. Strain of trapezius muscle, unspecified laterality, initial encounter  S46.819A        DISPOSITION         Discharge Medication List as of 6/26/2022 11:25 AM      START taking these medications    Details   methocarbamol (ROBAXIN) 500 MG tablet Take 1 tablet by mouth 3 times daily as needed (Pain/spasm), Disp-12 tablet, R-0Print      meloxicam (MOBIC) 7.5 MG tablet Take 1 tablet by mouth daily for 4 days, Disp-4 tablet, R-0Print      lidocaine 4 % external patch Place 1 patch onto the skin daily for 5 days, TransDERmal, DAILY Starting Sun 6/26/2022, Until Fri 7/1/2022, For 5 days, Disp-5 patch, R-0, Print      predniSONE (DELTASONE) 10 MG tablet Take 4 tablets by mouth daily (with breakfast) for 4 days, Disp-16 tablet, R-0Print             Orders Placed This Encounter   Procedures    CT THORACIC SPINE WO CONTRAST    POCT Urine Dipstick        Young Beltran NP, APRN - CNP 8:58 AM      MDM  Number of Diagnoses or Management Options  80-year-old male in the ED with continued upper back pain, prior low back pain now resolved. 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 No radiation of pain, no chest pain, difficulty breathing, no cough or hemoptysis, dizziness lightheadedness. No abdominal pain or low back pain. No urinary complaint fevers or chills. No history of IV drug use, spinal surgery, cancer, recent surgery, spinal abscess or other concerning feature.   Hematoma normal tandem gait, no incontinence or difficulty to bladder or bowels, abdomen soft and nontender, no low back pain or step-off, he has diffuse upper back pain to include midline and pain most significant at the trapezius, pain is increased with shrugging shoulders and retracting the scapula. Neck is supple, nontender, no step-off, full range of motion without limitation. Strength and sensation are intact. Recent radiographs are reassuring. Pain additional imaging, no acute process on CT thoracic. UA without indication of infection, no blood. Reports relief from meds in ED. Will provide short course of oral steroids, muscle relaxer, NSAID and provided contact number for follow-up with PCP and orthopedics. Made aware of danger signs be watchful of, given strict return precautions. A broad differential of diagnoses has been considered for evaluation of this patient's back pain. This includes but is not limited to: acute vascular emergencies such as aortic dissection and aortic anneurysm rupture, cauda equina syndrome, spinal / epidural abscess, pneumothorax, pyelonephritis, obstructive urolithiasis, muscle strain and disc herniation   Conditions that are considered to have sufficiently low pre-test probability after history and physical exam will receive no additional laboratory, imaging or invasive workup urgently in the Emergency Department. Low suspicion at this point for acute infectious, orthopedic, neurologic or spinal cord emergencies. Low suspicion for cardiovascular emergencies at this time. Plan for supportive care measures. Patient is afebrile, hemodynamically stable and in no acute distress. Patient is not ill-appearing. All findings and plans were discussed with the patient. Patient verbalizes desire to be discharged home at this time. All questions answered. Discussed with the patient that an unremarkable evaluation in the ED does not preclude the development or presence of a serious or life threatening condition.  Patient was instructed to return immediately for any worsening or change in current symptoms, or if symptoms do not continue to improve. I instructed them to follow up with their primary care provider, own specialist, or medical provider that I am recommending for him within the next 2-3 days   The patient acknowledged understanding plan of care and affirmed approval. Patient is discharged home, with no further complaint. Disposition: Discharged           Signed by: CARTER Her     Abby Medellin is a 40 y.o. male who presents to the Emergency Department with chief complaint of    Chief Complaint   Patient presents with    Back Pain      HPI  27-year-old male presents the emergency department with complaint of upper back pain x1.5 weeks. 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 Patient was seen by this provider 1 week ago for this complaint. Patient states that pain is localized to the upper back, midline and trapezius. States that the lower back pain that he was complaining of at last episode of care has resolved. 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 States medications prescribed improved his symptoms, but he is out of them. States he called to get follow-up with the [facility] but they could not see him for the next month due to his insurance status. Returns with continued pain, denies new or worsening symptoms, other injury, other complaint. With no chest pain, difficulty breathing, cough or hemoptysis, no numbness/tingling/weakness, no urinary complaint, no abdominal pain, night sweats, unplanned weight loss. Patient recent illness. Denies history of spinal surgery/abscess/recent surgery/cancer/IV drug use. Denies cardiac history, strict PE or DVT, known PE or DVT risk factors. Denies other complaint.   Pleasant well-appearing, ambulatory normal tandem gait and station, no incontinence, difficulty to bladder or bowels, saddle anesthesia, rectal central laxity. Review of Systems  Constitutional: Negative for fever. Negative for appetite change, chills, diaphoresis and unexpected weight change. HENT: Negative     Eyes: Negative   Respiratory: Negative  Cardiovascular: Negative  Musculoskeletal: As in HPI  Skin: Negative     Allergic/Immunologic: Negative  Neurological: Negative                          History reviewed. No pertinent past medical history. History reviewed. No pertinent surgical history. History reviewed. No pertinent family history. Social Connections:     Frequency of Communication with Friends and Family: Not on file    Frequency of Social Gatherings with Friends and Family: Not on file    Attends Jainism Services: Not on file    Active Member of Clubs or Organizations: Not on file    Attends Club or Organization Meetings: Not on file    Marital Status: Not on file        No Known Allergies     Vitals signs and nursing note reviewed. Patient Vitals for the past 4 hrs:   Temp Pulse Resp BP SpO2   06/26/22 0835 97.8 °F (36.6 °C) 79 18 133/72 98 %          Physical Exam   Constitutional: Oriented to person, place, and time. Appears well-developed and well-nourished. No distress. HENT:    Head: Normocephalic and atraumatic  Right Ear: External ear normal.    Left Ear: External ear normal.     Nose: Nose normal.   Mouth/Throat: Mouth normal.    Eyes: Conjunctivae are normal. Pupils are equal, round, and reactive to light. Neck: Supple. No tracheal deviation. Cardiovascular: Normal rate, intact distal pulses. Brisk capillary refill intact, less than 2 seconds. Regular rhythm present. Pulmonary/Chest: Lungs are equal bilaterally. No respiratory distress. Abdominal: Soft. There is no tenderness.     Musculoskeletal: Back: No bruising, no swelling, no deformity. + Fuhs thoracic tenderness, including midline, most significant at rhomboid and trapezius muscles, pain made worse with range of motion and scapular retraction. No cervical/lumbar/sacral tenderness, to include midline. Normal active range of motion, but with report of increased back pain. No pain with passive ROM. No pain with internal/external rotation of the hips bilaterally. No edema, instability, crepitus, or deformity. Neurological: Alert and oriented to person, place, and time. Normal muscle tone. Coordination normal. GCS= 15. Sensation: Intact and symmetric from L2 - S1 bilaterally. Brisk reflexes present, 2/2, bilateral lower extremities. Negative clonus at the ankles. Negative SLR. 5/5 strength and intact of lower extremities, bilaterally. Normal gait - no difficulty with Tandem gait. No saddle anesthesia. No incontinence. Skin: Skin is warm and dry. Capillary refill takes less than 2 seconds. No abrasion, no lesion, no petechiae and no rash noted. Not diaphoretic. No cyanosis, erythema, or pallor. Psychiatric: Normal mood and affect. Behavior is normal.    Nursing note and vitals reviewed. Procedures      Labs Reviewed - No data to display     CT THORACIC SPINE WO CONTRAST    (Results Pending)                          Voice dictation software was used during the making of this note. This software is not perfect and grammatical and other typographical errors may be present. This note has not been completely proofread for errors.      KULWANT Higuera - CNP  06/26/22 [id number]

## 2022-06-27 ENCOUNTER — TELEPHONE (OUTPATIENT)
Dept: ORTHOPEDIC SURGERY | Age: 37
End: 2022-06-27

## 2022-06-27 NOTE — TELEPHONE ENCOUNTER
----- Message from Ian Dykes sent at 6/27/2022 11:09 AM EDT -----  Regarding: appt  He needs to make an appointment for his back but hasn't been able to get through. Please call him at [phone].      I called and left a vm for patient to call us and make an appt

## 2022-08-15 ENCOUNTER — HOSPITAL ENCOUNTER (EMERGENCY)
Age: 37
Discharge: HOME | End: 2022-08-15
Attending: EMERGENCY MEDICINE
Payer: MEDICAID

## 2022-08-15 VITALS
HEIGHT: 70 IN | HEART RATE: 72 BPM | TEMPERATURE: 98 F | DIASTOLIC BLOOD PRESSURE: 66 MMHG | OXYGEN SATURATION: 99 % | WEIGHT: 120 LBS | BODY MASS INDEX: 17.18 KG/M2 | RESPIRATION RATE: 16 BRPM | SYSTOLIC BLOOD PRESSURE: 104 MMHG

## 2022-08-15 DIAGNOSIS — S29.019A THORACIC MYOFASCIAL STRAIN, INITIAL ENCOUNTER: Primary | ICD-10-CM

## 2022-08-15 PROCEDURE — 6370000000 HC RX 637 (ALT 250 FOR IP): Performed by: PHYSICIAN ASSISTANT

## 2022-08-15 PROCEDURE — 96372 THER/PROPH/DIAG INJ SC/IM: CPT

## 2022-08-15 PROCEDURE — 6360000002 HC RX W HCPCS: Performed by: PHYSICIAN ASSISTANT

## 2022-08-15 PROCEDURE — 99284 EMERGENCY DEPT VISIT MOD MDM: CPT

## 2022-08-15 RX ORDER — LIDOCAINE 50 MG/G
1 PATCH TOPICAL DAILY
Qty: 14 PATCH | Refills: 0 | Status: SHIPPED | OUTPATIENT
Start: 2022-08-15 | End: 2022-08-29

## 2022-08-15 RX ORDER — PREDNISONE 10 MG/1
20 TABLET ORAL
Status: COMPLETED | OUTPATIENT
Start: 2022-08-15 | End: 2022-08-15

## 2022-08-15 RX ORDER — MELOXICAM 15 MG/1
15 TABLET ORAL DAILY
Qty: 14 TABLET | Refills: 0 | Status: SHIPPED | OUTPATIENT
Start: 2022-08-15 | End: 2022-08-29

## 2022-08-15 RX ORDER — METHOCARBAMOL 750 MG/1
750 TABLET, FILM COATED ORAL 3 TIMES DAILY
Qty: 21 TABLET | Refills: 0 | Status: SHIPPED | OUTPATIENT
Start: 2022-08-15 | End: 2022-08-22

## 2022-08-15 RX ORDER — KETOROLAC TROMETHAMINE 30 MG/ML
30 INJECTION, SOLUTION INTRAMUSCULAR; INTRAVENOUS
Status: COMPLETED | OUTPATIENT
Start: 2022-08-15 | End: 2022-08-15

## 2022-08-15 RX ORDER — METHOCARBAMOL 750 MG/1
750 TABLET, FILM COATED ORAL
Status: COMPLETED | OUTPATIENT
Start: 2022-08-15 | End: 2022-08-15

## 2022-08-15 RX ADMIN — PREDNISONE 20 MG: 10 TABLET ORAL at 12:55

## 2022-08-15 RX ADMIN — METHOCARBAMOL 750 MG: 750 TABLET ORAL at 12:54

## 2022-08-15 RX ADMIN — KETOROLAC TROMETHAMINE 30 MG: 30 INJECTION, SOLUTION INTRAMUSCULAR at 12:55

## 2022-08-15 ASSESSMENT — PAIN DESCRIPTION - LOCATION
LOCATION: SHOULDER
LOCATION: SHOULDER
LOCATION: BACK;SHOULDER

## 2022-08-15 ASSESSMENT — PAIN SCALES - GENERAL
PAINLEVEL_OUTOF10: 9

## 2022-08-15 ASSESSMENT — PAIN DESCRIPTION - FREQUENCY: FREQUENCY: CONTINUOUS

## 2022-08-15 ASSESSMENT — PAIN DESCRIPTION - DESCRIPTORS
DESCRIPTORS: ACHING;DISCOMFORT
DESCRIPTORS: ACHING;SHOOTING
DESCRIPTORS: ACHING;DISCOMFORT

## 2022-08-15 ASSESSMENT — PAIN DESCRIPTION - ORIENTATION
ORIENTATION: RIGHT;LEFT
ORIENTATION: LEFT;RIGHT
ORIENTATION: RIGHT;LEFT

## 2022-08-15 ASSESSMENT — PAIN - FUNCTIONAL ASSESSMENT
PAIN_FUNCTIONAL_ASSESSMENT: 0-10
PAIN_FUNCTIONAL_ASSESSMENT: ACTIVITIES ARE NOT PREVENTED
PAIN_FUNCTIONAL_ASSESSMENT: ACTIVITIES ARE NOT PREVENTED

## 2022-08-15 ASSESSMENT — PAIN DESCRIPTION - PAIN TYPE: TYPE: ACUTE PAIN

## 2022-08-15 NOTE — ED TRIAGE NOTES
Pt reports back spasms and pain [id number] now radiating up to bilateral shoulders since yesterday (-)injury/trauma, numbness. Pt does heavy, repetitive lifting at work.    A&Ox4

## 2022-08-15 NOTE — DISCHARGE INSTRUCTIONS
Continue working with physical therapy as previously scheduled. Use Mobic once daily as needed for pain and inflammation. Use the muscle relaxer as needed for muscle spasm. You can also use topical lidocaine patches for additional pain relief. Follow-up with your PCP in 1 to 2 days if no improvement. Return to the ER for any new or worsening symptoms.

## 2022-08-15 NOTE — ED PROVIDER NOTES
[facility] Provider Note                   PCP:                None Provider               Age: 40 y.o. Sex: male       ICD-10-CM    1. Thoracic myofascial strain, initial encounter  S29.019A           DISPOSITION Decision To Discharge 08/15/2022 01:07:36 PM       MDM  Number of Diagnoses or Management Options  Thoracic myofascial strain, initial encounter  Diagnosis management comments: Ddx: Muscle strain, muscle spasm, disc herniation, radiculopathy, fracture, spondylolisthesis, epidural abscess    Overall patient is a well-appearing 49-year-old male presenting today for upper back pain that has been on and off over the last month and a half. His job does require repetitive heavy lifting at work. He has already had x-ray images and CT thoracic spine that were both unremarkable a little over a month ago. There has been no acute worsening of his symptoms, no unintentional weight loss, unexplained fevers, weakness. No bowel or bladder dysfunction or other red flag symptoms. Believe symptoms are more muscular in etiology, will treat with anti-inflammatories, muscle relaxer and patient will continue to work with physical therapy. Amount and/or Complexity of Data Reviewed  Tests in the radiology section of CPT®: ordered and reviewed         No orders of the defined types were placed in this encounter. Silas Dixon is a 40 y.o. male who presents to the Emergency Department with chief complaint of    Chief Complaint   Patient presents with    Back Pain      49-year-old well-appearing male presents today for evaluation of upper back pain. Symptoms flared up a few days ago. This has been an ongoing issue for patient in the past.  He has been seen on 2 different occasions for similar symptoms about a month and a half ago.   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 He states that by the end of the day his upper back is very sore, feels like a spasm, and that any type of movement of the arms or back will cause him pain. He also states that symptoms worsen after physical therapy, he works with PT about once a week. He denies any associated shortness of breath, chest pain, pleuritic pain. No abdominal pain. He denies any neck pain, headache, blurry vision, dizziness. No recent hospitalizations or surgeries. Per chart review patient did have x-rays performed on 6/19 that were unremarkable, he then returned on 6/26 for ongoing pain and had CT thoracic spine that was also unremarkable. The history is provided by the patient. No  was used. All other systems reviewed and are negative. Review of Systems   Constitutional:  Negative for activity change and unexpected weight change. Respiratory:  Negative for shortness of breath. Cardiovascular:  Negative for chest pain. Gastrointestinal:  Negative for abdominal pain. Musculoskeletal:  Positive for back pain. Negative for neck pain. Skin:  Negative for wound. Allergic/Immunologic: Negative for immunocompromised state. Neurological:  Negative for weakness, numbness and headaches. Hematological:  Does not bruise/bleed easily. History reviewed. No pertinent past medical history. History reviewed. No pertinent surgical history. History reviewed. No pertinent family history. Social History     Socioeconomic History    Marital status: Single     Spouse name: None    Number of children: None    Years of education: None    Highest education level: None   Tobacco Use    Smoking status: Never    Smokeless tobacco: Never   Substance and Sexual Activity    Alcohol use: Not Currently    Drug use: Never        Allergies: Patient has no known allergies.     Discharge Medication List as of 8/15/2022  1:10 PM        CONTINUE these medications which have NOT CHANGED Details   ibuprofen (ADVIL;MOTRIN) 600 MG tablet Take 1 tablet by mouth every 8 hours as needed for Pain, Disp-12 tablet, R-0Print              Vitals signs and nursing note reviewed. No data found. Physical Exam  Vitals and nursing note reviewed. Constitutional:       General: He is not in acute distress. Appearance: Normal appearance. HENT:      Head: Normocephalic and atraumatic. Eyes:      Conjunctiva/sclera: Conjunctivae normal.   Cardiovascular:      Rate and Rhythm: Normal rate and regular rhythm. Heart sounds: No murmur heard. No friction rub. No gallop. Pulmonary:      Effort: Pulmonary effort is normal.      Breath sounds: No wheezing, rhonchi or rales. Musculoskeletal:      Thoracic back: Spasms and tenderness present. No bony tenderness. Normal range of motion. Lumbar back: Normal. No tenderness. Normal range of motion. Skin:     General: Skin is warm and dry. Capillary Refill: Capillary refill takes less than 2 seconds. Neurological:      Mental Status: He is alert. Psychiatric:         Mood and Affect: Mood normal.         Thought Content: Thought content normal.         Judgment: Judgment normal.        Procedures    Labs Reviewed - No data to display     No orders to display                            Voice dictation software was used during the making of this note. This software is not perfect and grammatical and other typographical errors may be present. This note has not been completely proofread for errors.        Everton Lermama  08/16/22 3791

## 2022-08-15 NOTE — ED NOTES
I have reviewed discharge instructions with the patient. The patient verbalized understanding. Patient left ED via Discharge Method: ambulatory to Home with self. Opportunity for questions and clarification provided. Patient given 3 scripts. No e-sign. To continue your aftercare when you leave the hospital, you may receive an automated call from our care team to check in on how you are doing. This is a free service and part of our promise to provide the best care and service to meet your aftercare needs.  If you have questions, or wish to unsubscribe from this service please call [phone]. Thank you for Choosing our [address] [place] [address] Emergency Department.          Parveen Crowder RN  08/15/22 9649

## 2022-08-16 ASSESSMENT — ENCOUNTER SYMPTOMS
BACK PAIN: 1
SHORTNESS OF BREATH: 0
ABDOMINAL PAIN: 0

## 2022-08-27 ENCOUNTER — HOSPITAL ENCOUNTER (EMERGENCY)
Age: 37
Discharge: HOME | End: 2022-08-27
Attending: EMERGENCY MEDICINE
Payer: MEDICAID

## 2022-08-27 VITALS
RESPIRATION RATE: 16 BRPM | DIASTOLIC BLOOD PRESSURE: 73 MMHG | WEIGHT: 120 LBS | TEMPERATURE: 98.7 F | BODY MASS INDEX: 19.29 KG/M2 | HEIGHT: 66 IN | HEART RATE: 81 BPM | SYSTOLIC BLOOD PRESSURE: 121 MMHG | OXYGEN SATURATION: 100 %

## 2022-08-27 DIAGNOSIS — U07.1 COVID-19 VIRUS INFECTION: Primary | ICD-10-CM

## 2022-08-27 LAB
SARS-COV-2 RDRP RESP QL NAA+PROBE: DETECTED
SOURCE: ABNORMAL

## 2022-08-27 PROCEDURE — 99283 EMERGENCY DEPT VISIT LOW MDM: CPT

## 2022-08-27 PROCEDURE — 87635 SARS-COV-2 COVID-19 AMP PRB: CPT

## 2022-08-27 ASSESSMENT — ENCOUNTER SYMPTOMS
DIARRHEA: 0
RHINORRHEA: 0
SHORTNESS OF BREATH: 0
COUGH: 1
NAUSEA: 0
VOMITING: 0

## 2022-08-27 ASSESSMENT — PAIN - FUNCTIONAL ASSESSMENT: PAIN_FUNCTIONAL_ASSESSMENT: 0-10

## 2022-08-27 ASSESSMENT — PAIN SCALES - GENERAL: PAINLEVEL_OUTOF10: 9

## 2022-08-27 NOTE — ED PROVIDER NOTES
[facility] Emergency Department Provider Note                   PCP:                None Provider               Age: 40 y.o. Sex: male       ICD-10-CM    1. COVID-19 virus infection  U07.1           DISPOSITION Decision To Discharge 08/27/2022 05:20:38 AM        MDM  Number of Diagnoses or Management Options  Diagnosis management comments: Patient does not meet criteria for treatment with oral antivirals for COVID. He is vaccinated but not boosted. His vitals are stable and his lungs are clear. He will be provided with a work note to quarantine for 5 days followed by masking for an additional 5 days. Today is day 1. Today was day 0. Amount and/or Complexity of Data Reviewed  Clinical lab tests: ordered and reviewed    Risk of Complications, Morbidity, and/or Mortality  Presenting problems: low  Diagnostic procedures: minimal  Management options: low    Patient Progress  Patient progress: stable       Orders Placed This Encounter   Procedures    COVID-19, Susanna Chao is a 40 y.o. male who presents to the Emergency Department with chief complaint of    Chief Complaint   Patient presents with    Concern For COVID-19      Patient presents with cough, upper respiratory congestion, muscle aches and fevers and chills onset yesterday. Recent exposure to COVID. When asked patient reports he did take a home test that was positive but states he needs a test from the hospital for his work. No chest pain or trouble breathing. He denies any past medical history or medications. No allergies. Surgical history noncontributory. Social history he denies tobacco alcohol or drugs. Review of Systems   Constitutional:  Positive for chills, fatigue and fever. HENT:  Positive for congestion. Negative for rhinorrhea. Respiratory:  Positive for cough. Negative for shortness of breath. Cardiovascular:  Negative for chest pain and leg swelling.    Gastrointestinal:  Negative for diarrhea, nausea and vomiting. Musculoskeletal:  Positive for myalgias. Negative for arthralgias. History reviewed. No pertinent past medical history. History reviewed. No pertinent surgical history. History reviewed. No pertinent family history. Social History     Socioeconomic History    Marital status: Single     Spouse name: None    Number of children: None    Years of education: None    Highest education level: None   Tobacco Use    Smoking status: Never    Smokeless tobacco: Never   Substance and Sexual Activity    Alcohol use: Not Currently    Drug use: Never         Patient has no known allergies. Previous Medications    IBUPROFEN (ADVIL;MOTRIN) 600 MG TABLET    Take 1 tablet by mouth every 8 hours as needed for Pain    LIDOCAINE (LIDODERM) 5 %    Place 1 patch onto the skin in the morning for 14 days. 12 hours on, 12 hours off. .    MELOXICAM (MOBIC) 15 MG TABLET    Take 1 tablet by mouth in the morning for 14 days. MELOXICAM (MOBIC) 7.5 MG TABLET    Take 1 tablet by mouth daily for 4 days        Vitals signs and nursing note reviewed. Patient Vitals for the past 4 hrs:   Temp Pulse Resp BP SpO2   08/27/22 0500 98.7 °F (37.1 °C) 81 16 121/73 100 %          Physical Exam  Vitals and nursing note reviewed. Constitutional:       Appearance: Normal appearance. HENT:      Head: Normocephalic and atraumatic. Nose: Nose normal.      Mouth/Throat:      Mouth: Mucous membranes are moist.   Eyes:      Conjunctiva/sclera: Conjunctivae normal.      Pupils: Pupils are equal, round, and reactive to light. Cardiovascular:      Rate and Rhythm: Normal rate and regular rhythm. Pulses: Normal pulses. Heart sounds: Normal heart sounds. Pulmonary:      Effort: Pulmonary effort is normal.      Breath sounds: Normal breath sounds. Abdominal:      General: There is no distension. Palpations: Abdomen is soft. Tenderness: There is no abdominal tenderness.  There is no guarding or

## 2022-08-27 NOTE — ED TRIAGE NOTES
Pt c/o being exposed to Covid 19 ruthie 2 days ago. Pt c/o body aches, chills, and nasal congestion. Pt masked.

## 2022-08-27 NOTE — Clinical Note
Silas Dixon was seen and treated in our emergency department on 8/27/2022. COVID19 virus is suspected. Per the CDC guidelines we recommend home isolation until the following conditions are all met:    1. At least five days have passed since symptoms first appeared and/or had a close exposure,   2. After home isolation for five days, wearing a mask around others for the next five days,  3. At least 24 have passed since last fever without the use of fever-reducing medications and  4. Symptoms (eg cough, shortness of breath) have improved    If you have any questions or concerns, please don't hesitate to call. He may return to work/school on 09/01/2022    Mr. Marylen Staple must wear a mask September 1 through September 5th when around others.      Bhanu Lewis MD

## 2022-08-29 ENCOUNTER — CARE COORDINATION (OUTPATIENT)
Dept: CARE COORDINATION | Facility: CLINIC | Age: 37
End: 2022-08-29

## 2022-08-29 NOTE — CARE COORDINATION
Patient contacted regarding COVID-19 diagnosis. Discussed COVID-19 related testing which was available at this time. Test results were positive. Patient informed of results, if available? Yes. LPN Care Coordinator contacted the patient by telephone to perform post discharge assessment. Call within 2 business days of discharge: Yes. Verified name and  with patient as identifiers. Provided introduction to self, and explanation of the CTN/ACM role, and reason for call due to risk factors for infection and/or exposure to COVID-19. Symptoms reviewed with patient who verbalized the following symptoms: fatigue  cough  chills or shaking  sweating  no new symptoms  no worsening symptoms. Due to no new or worsening symptoms encounter was not routed to provider for escalation. Discussed follow-up appointments. If no appointment was previously scheduled, appointment scheduling offered: Yes. [facility] follow up appointment(s): No future appointments. Non-[facility] follow up appointment(s):    SEP 13   2022 11:00 AM - Office Visit  Tony Baird NP       Non-face-to-face services provided:  Scheduled appointment with PCP-see above  Obtained and reviewed discharge summary and/or continuity of care documents  Education of patient/family/caregiver/guardian to support self-management-patient voiced understanding      Advance Care Planning:   Does patient have an Advance Directive:  not on file. Educated patient about risk for severe COVID-19 due to risk factors according to CDC guidelines. LPN CC reviewed discharge instructions, medical action plan and red flag symptoms with the patient who verbalized understanding. Discussed COVID vaccination status: Yes. Education provided on COVID-19 vaccination as appropriate. Discussed exposure protocols and quarantine with CDC Guidelines.  Patient was given an opportunity to verbalize any questions and concerns and agrees to contact LPN CC or [facility] care provider for questions related to their healthcare. Reviewed and educated patient on any new and changed medications related to discharge diagnosis     Was patient discharged with a pulse oximeter? no      LPN CC provided contact information. Plan for follow-up call in 5-8 days  based on severity of symptoms and risk factors.

## 2022-09-06 ENCOUNTER — CARE COORDINATION (OUTPATIENT)
Dept: CARE COORDINATION | Facility: CLINIC | Age: 37
End: 2022-09-06

## 2022-09-06 NOTE — CARE COORDINATION
You Patient resolved from the Care Transitions episode on 9/6/22  Discussed COVID-19 related testing which was available at this time. Test results were positive. Patient informed of results, if available? Patient aware    Patient/family has been provided the following resources and education related to COVID-19:                         Signs, symptoms and red flags related to COVID-19            CDC exposure and quarantine guidelines              Patient currently reports that the following symptoms have improved:  Patient reports all covid symptoms have resolved. Patient will f/u for scheduled appointment with [facility] Watts on 9/13 @ 11am.     No further outreach scheduled with this LPN CC  Episode of Care resolved. Patient has this LPN CC contact information if future needs arise.

## 2022-12-15 ENCOUNTER — HOSPITAL ENCOUNTER (EMERGENCY)
Age: 37
Discharge: HOME | End: 2022-12-15
Attending: EMERGENCY MEDICINE
Payer: MEDICAID

## 2022-12-15 ENCOUNTER — HOSPITAL ENCOUNTER (EMERGENCY)
Dept: GENERAL RADIOLOGY | Age: 37
End: 2022-12-15
Payer: MEDICAID

## 2022-12-15 VITALS
OXYGEN SATURATION: 98 % | HEART RATE: 75 BPM | TEMPERATURE: 97.8 F | DIASTOLIC BLOOD PRESSURE: 75 MMHG | WEIGHT: 125 LBS | RESPIRATION RATE: 21 BRPM | HEIGHT: 66 IN | BODY MASS INDEX: 20.09 KG/M2 | SYSTOLIC BLOOD PRESSURE: 130 MMHG

## 2022-12-15 DIAGNOSIS — R05.1 ACUTE COUGH: Primary | ICD-10-CM

## 2022-12-15 LAB
FLUAV AG NPH QL IA: NEGATIVE
FLUBV AG NPH QL IA: NEGATIVE
SARS-COV-2 RDRP RESP QL NAA+PROBE: NOT DETECTED
SOURCE: NORMAL
SPECIMEN SOURCE: NORMAL
STREP, MOLECULAR: NOT DETECTED

## 2022-12-15 PROCEDURE — 87804 INFLUENZA ASSAY W/OPTIC: CPT

## 2022-12-15 PROCEDURE — 71045 X-RAY EXAM CHEST 1 VIEW: CPT

## 2022-12-15 PROCEDURE — 6360000002 HC RX W HCPCS: Performed by: EMERGENCY MEDICINE

## 2022-12-15 PROCEDURE — 87635 SARS-COV-2 COVID-19 AMP PRB: CPT

## 2022-12-15 PROCEDURE — 87651 STREP A DNA AMP PROBE: CPT

## 2022-12-15 PROCEDURE — 99284 EMERGENCY DEPT VISIT MOD MDM: CPT

## 2022-12-15 PROCEDURE — 96372 THER/PROPH/DIAG INJ SC/IM: CPT

## 2022-12-15 RX ORDER — BENZONATATE 100 MG/1
100 CAPSULE ORAL 3 TIMES DAILY PRN
Qty: 30 CAPSULE | Refills: 0 | Status: SHIPPED | OUTPATIENT
Start: 2022-12-15 | End: 2022-12-22

## 2022-12-15 RX ORDER — KETOROLAC TROMETHAMINE 30 MG/ML
30 INJECTION, SOLUTION INTRAMUSCULAR; INTRAVENOUS ONCE
Status: COMPLETED | OUTPATIENT
Start: 2022-12-15 | End: 2022-12-15

## 2022-12-15 RX ADMIN — KETOROLAC TROMETHAMINE 30 MG: 30 INJECTION, SOLUTION INTRAMUSCULAR; INTRAVENOUS at 22:33

## 2022-12-15 ASSESSMENT — ENCOUNTER SYMPTOMS
GASTROINTESTINAL NEGATIVE: 1
EYES NEGATIVE: 1
COUGH: 1

## 2022-12-15 ASSESSMENT — PAIN DESCRIPTION - DESCRIPTORS: DESCRIPTORS: ACHING;SORE

## 2022-12-15 ASSESSMENT — PAIN DESCRIPTION - LOCATION: LOCATION: HEAD;THROAT

## 2022-12-15 ASSESSMENT — PAIN SCALES - GENERAL
PAINLEVEL_OUTOF10: 8
PAINLEVEL_OUTOF10: 8

## 2022-12-15 NOTE — Clinical Note
Juanjose Zamarripa was seen and treated in our emergency department on 12/15/2022. He may return to work on 12/17/2022. If you have any questions or concerns, please don't hesitate to call.       Sherrie Matthews MD

## 2022-12-16 NOTE — ED PROVIDER NOTES
Emergency Department Provider Note                   PCP:                Md Sigifredo Arana (Inactive)               Age: 40 y.o. Sex: male       ICD-10-CM    1. Acute cough  R05.1           DISPOSITION Decision To Discharge 12/15/2022 11:10:24 PM        MDM  Number of Diagnoses or Management Options  Acute cough  Diagnosis management comments: Patient patient has less than 2 days of symptoms including cough. Slight shortness of breath. He is breathing comfortably 98% on room air. Strep, COVID, flu are negative. He did endorse some sore throat. Patient received Toradol IM shot. Chest x-ray was also obtained that was clear for any infiltrate. Patient is stable for discharge home. We discussed treatment prescription for Tessalon Perles along with Tylenol ibuprofen staying hydrated and reasons to return to the emergency department. Orders Placed This Encounter   Procedures    Rapid influenza A/B antigens    COVID-19, Rapid    Group A Strep Screen By PCR    XR CHEST PORTABLE        Medications   ketorolac (TORADOL) injection 30 mg (30 mg IntraMUSCular Given 12/15/22 2233)       New Prescriptions    BENZONATATE (TESSALON) 100 MG CAPSULE    Take 1 capsule by mouth 3 times daily as needed for Cough        Lindsay Romero is a 40 y.o. male who presents to the Emergency Department with chief complaint of    Chief Complaint   Patient presents with    Cough      40-year-old male presenting with cough, viral-like symptoms. Minimal shortness of breath. No chest pain, no abdominal pain altered mental status change in urination or bowel habit. Review of Systems   HENT: Negative. Eyes: Negative. Respiratory:  Positive for cough. Cardiovascular: Negative. Gastrointestinal: Negative. Genitourinary: Negative. Musculoskeletal: Negative. Skin: Negative. Neurological: Negative. Psychiatric/Behavioral: Negative. No past medical history on file.      No past surgical history on file. No family history on file. Social History     Socioeconomic History    Marital status: Single   Tobacco Use    Smoking status: Never    Smokeless tobacco: Never   Substance and Sexual Activity    Alcohol use: Not Currently    Drug use: Never         Patient has no known allergies. Previous Medications    IBUPROFEN (ADVIL;MOTRIN) 600 MG TABLET    Take 1 tablet by mouth every 8 hours as needed for Pain    MELOXICAM (MOBIC) 15 MG TABLET    Take 1 tablet by mouth in the morning for 14 days. MELOXICAM (MOBIC) 7.5 MG TABLET    Take 1 tablet by mouth daily for 4 days        Vitals signs and nursing note reviewed. Patient Vitals for the past 4 hrs:   Temp Pulse Resp BP SpO2   12/15/22 2201 97.8 °F (36.6 °C) 75 21 130/75 98 %          Physical Exam  Constitutional:       General: He is not in acute distress. Appearance: Normal appearance. He is not ill-appearing. HENT:      Head: Normocephalic and atraumatic. Nose: No rhinorrhea. Mouth/Throat:      Mouth: Mucous membranes are moist.   Eyes:      Extraocular Movements: Extraocular movements intact. Cardiovascular:      Rate and Rhythm: Normal rate and regular rhythm. Pulmonary:      Effort: Pulmonary effort is normal.      Breath sounds: Normal breath sounds. Abdominal:      General: Abdomen is flat. Bowel sounds are normal. There is no distension. Palpations: Abdomen is soft. Tenderness: There is no abdominal tenderness. Musculoskeletal:         General: Normal range of motion. Cervical back: Normal range of motion. Skin:     General: Skin is warm and dry. Neurological:      General: No focal deficit present. Mental Status: He is alert.    Psychiatric:         Mood and Affect: Mood normal.        Procedures    Results for orders placed or performed during the hospital encounter of 12/15/22   Rapid influenza A/B antigens    Specimen: Nasal Washing   Result Value Ref Range    Influenza A Ag Negative NEG      Influenza B Ag Negative NEG      Source Nasopharyngeal     COVID-19, Rapid    Specimen: Nasopharyngeal   Result Value Ref Range    Source NASAL SWAB      SARS-CoV-2, Rapid Not detected NOTD     Group A Strep Screen By PCR    Specimen: Throat   Result Value Ref Range    Strep, Molecular Not detected     XR CHEST PORTABLE    Narrative    EXAM: Chest x-ray. INDICATION: Cough. COMPARISON: Prior chest x-ray on September 4, 2014. TECHNIQUE: Single frontal view. FINDINGS: The lungs are clear. The cardiac size, mediastinal contour and  pulmonary vasculature are normal. No pneumothorax or pleural effusion is seen. The bones are intact. Impression    Normal chest x-ray. XR CHEST PORTABLE   Final Result   Normal chest x-ray. Voice dictation software was used during the making of this note. This software is not perfect and grammatical and other typographical errors may be present. This note has not been completely proofread for errors.      Swapnil Moore MD  12/15/22 [id number]

## 2022-12-16 NOTE — ED NOTES
I have reviewed discharge instructions with the patient. The patient verbalized understanding. Patient left ED via Discharge Method: ambulatory to Home with self. Opportunity for questions and clarification provided. Patient given 1 scripts. To continue your aftercare when you leave the hospital, you may receive an automated call from our care team to check in on how you are doing. This is a free service and part of our promise to provide the best care and service to meet your aftercare needs.  If you have questions, or wish to unsubscribe from this service please call [phone]. Thank you for Choosing our [facility] Emergency Department.         Beatrice Mtz RN  12/15/22 0589

## 2022-12-16 NOTE — ED TRIAGE NOTES
Patient ambulatory to triage. Patient c\o congestion, cough and sore throat. Patient states these symptoms began yesterday.  Patient states last last dose of Tylenol was at 8 pm.

## 2023-02-27 ENCOUNTER — HOSPITAL ENCOUNTER (EMERGENCY)
Age: 38
Discharge: HOME | End: 2023-02-27
Attending: EMERGENCY MEDICINE
Payer: MEDICAID

## 2023-02-27 VITALS
BODY MASS INDEX: 18.75 KG/M2 | SYSTOLIC BLOOD PRESSURE: 108 MMHG | TEMPERATURE: 99.1 F | WEIGHT: 131 LBS | DIASTOLIC BLOOD PRESSURE: 64 MMHG | HEIGHT: 70 IN | HEART RATE: 84 BPM | OXYGEN SATURATION: 98 % | RESPIRATION RATE: 16 BRPM

## 2023-02-27 DIAGNOSIS — J02.0 STREP PHARYNGITIS: Primary | ICD-10-CM

## 2023-02-27 LAB
FLUAV RNA SPEC QL NAA+PROBE: NOT DETECTED
FLUBV RNA SPEC QL NAA+PROBE: NOT DETECTED
SARS-COV-2 RDRP RESP QL NAA+PROBE: NOT DETECTED
SOURCE: NORMAL
STREP, MOLECULAR: DETECTED

## 2023-02-27 PROCEDURE — 87651 STREP A DNA AMP PROBE: CPT

## 2023-02-27 PROCEDURE — 87635 SARS-COV-2 COVID-19 AMP PRB: CPT

## 2023-02-27 PROCEDURE — 87502 INFLUENZA DNA AMP PROBE: CPT

## 2023-02-27 PROCEDURE — 99284 EMERGENCY DEPT VISIT MOD MDM: CPT

## 2023-02-27 PROCEDURE — 6370000000 HC RX 637 (ALT 250 FOR IP): Performed by: EMERGENCY MEDICINE

## 2023-02-27 PROCEDURE — 6360000002 HC RX W HCPCS: Performed by: PHYSICIAN ASSISTANT

## 2023-02-27 PROCEDURE — 96372 THER/PROPH/DIAG INJ SC/IM: CPT

## 2023-02-27 RX ORDER — IBUPROFEN 800 MG/1
800 TABLET ORAL ONCE
Status: COMPLETED | OUTPATIENT
Start: 2023-02-27 | End: 2023-02-27

## 2023-02-27 RX ADMIN — PENICILLIN G BENZATHINE 1.2 MILLION UNITS: 1200000 INJECTION, SUSPENSION INTRAMUSCULAR at 23:31

## 2023-02-27 RX ADMIN — IBUPROFEN 800 MG: 800 TABLET, FILM COATED ORAL at 21:22

## 2023-02-27 ASSESSMENT — ENCOUNTER SYMPTOMS
ALLERGIC/IMMUNOLOGIC NEGATIVE: 1
ABDOMINAL PAIN: 0
EYE DISCHARGE: 0
SHORTNESS OF BREATH: 0
VOICE CHANGE: 0
SORE THROAT: 1
GASTROINTESTINAL NEGATIVE: 1
EYES NEGATIVE: 1
RHINORRHEA: 1
SINUS CONGESTION: 0
TROUBLE SWALLOWING: 0
STRIDOR: 0
COUGH: 1

## 2023-02-27 ASSESSMENT — LIFESTYLE VARIABLES
HOW OFTEN DO YOU HAVE A DRINK CONTAINING ALCOHOL: MONTHLY OR LESS
HOW MANY STANDARD DRINKS CONTAINING ALCOHOL DO YOU HAVE ON A TYPICAL DAY: 1 OR 2

## 2023-02-27 NOTE — Clinical Note
Daniel Kay was seen and treated in our emergency department on 2/27/2023. He may return to work on 03/01/2023. If you have any questions or concerns, please don't hesitate to call.       KAYLEE Acevedo

## 2023-02-28 NOTE — ED NOTES
Airway not compromised, able to swallow; however, painful per pt.       Eliz Carmen RN  02/27/23 2117

## 2023-02-28 NOTE — ED TRIAGE NOTES
Pt states starting 5 days ago patient started to have his tonsils swell. Pt states it has gotten progressively worse to where he can't swallow.

## 2023-02-28 NOTE — ED PROVIDER NOTES
Emergency Department Provider Note                   PCP:                On File Not (Inactive)               Age: 37 y.o.      Sex: male       ICD-10-CM    1. Strep pharyngitis  J02.0           DISPOSITION Discharge - Pending Orders Complete 02/27/2023 09:48:02 PM        Medical Decision Making  I will prophylactically treat for strep today based on the appearance of the throat and patient's symptoms. He should drink plenty of fluids, rest and return to the ED if worse. He is ambulatory out of the ED without difficulty and stable for discharge.  Patient presented with acute pharyngitis to the ED. History obtained from patient, additional history provided by independent historian none. I reviewed the following tests none. My independent interpretation of the data was done. I did not review records from an external source. I did engage in shared decision making with the patient/family. I considered further evaluation but did not do it due to patients exams and test results. There is no social determinant of health affecting care. I did not use a clinical guideline to determine care of the patient. I discussed results/disposition with patient.               Problems Addressed:  Strep pharyngitis: acute illness or injury    Amount and/or Complexity of Data Reviewed  Labs: ordered.    Risk  Prescription drug management.                                Orders Placed This Encounter   Procedures    Group A Strep Screen By PCR    COVID-19, Rapid    Influenza A/B, Molecular        Medications   penicillin G benzathine (BICILLIN L-A) 6542899 UNIT/2ML 1.2 Million Units (has no administration in time range)   ibuprofen (ADVIL;MOTRIN) tablet 800 mg (800 mg Oral Given 2/27/23 2122)       New Prescriptions    No medications on file        Mat Chilel is a 37 y.o. male who presents to the Emergency Department with chief complaint of    Chief Complaint   Patient presents with    Pharyngitis      Patient is here with fever,  rhinorrhea, sore throat, congestion, body aches and not feeling well for 5 days. Some nausea and vomiting. No chest pain, shortness of breath, abdominal pain, trouble with urination or bowel movements, dizziness, weakness, dyspnea on exertion, swelling/tingling or weakness to arms and legs. He ambulated to the room without difficulty and is well hydrated. The history is provided by the patient. Pharyngitis  Location:  Generalized  Quality:  Aching  Severity:  Moderate  Duration:  5 days  Timing:  Constant  Progression:  Worsening  Chronicity:  New  Relieved by:  Nothing  Worsened by:  Drinking and eating  Ineffective treatments:  None tried  Associated symptoms: adenopathy, chills, cough, fever and rhinorrhea    Associated symptoms: no abdominal pain, no chest pain, no drooling, no ear discharge, no ear pain, no epistaxis, no eye discharge, no headaches, no neck stiffness, no night sweats, no plugged ear sensation, no postnasal drip, no rash, no shortness of breath, no sinus congestion, no stridor, no trouble swallowing and no voice change        Review of Systems   Constitutional:  Positive for chills and fever. Negative for night sweats. HENT:  Positive for rhinorrhea and sore throat. Negative for drooling, ear discharge, ear pain, nosebleeds, postnasal drip, trouble swallowing and voice change. Eyes: Negative. Negative for discharge. Respiratory:  Positive for cough. Negative for shortness of breath and stridor. Cardiovascular: Negative. Negative for chest pain. Gastrointestinal: Negative. Negative for abdominal pain. Endocrine: Negative. Genitourinary: Negative. Musculoskeletal: Negative. Negative for neck stiffness. Skin: Negative. Negative for rash. Allergic/Immunologic: Negative. Neurological: Negative. Negative for headaches. Hematological:  Positive for adenopathy. Psychiatric/Behavioral: Negative. All other systems reviewed and are negative.     No past medical history on file. No past surgical history on file. No family history on file. Social History     Socioeconomic History    Marital status: Single   Tobacco Use    Smoking status: Never    Smokeless tobacco: Never   Substance and Sexual Activity    Alcohol use: Not Currently    Drug use: Never         Patient has no known allergies. Previous Medications    IBUPROFEN (ADVIL;MOTRIN) 600 MG TABLET    Take 1 tablet by mouth every 8 hours as needed for Pain    MELOXICAM (MOBIC) 15 MG TABLET    Take 1 tablet by mouth in the morning for 14 days. MELOXICAM (MOBIC) 7.5 MG TABLET    Take 1 tablet by mouth daily for 4 days        Vitals signs and nursing note reviewed. Patient Vitals for the past 4 hrs:   Temp Pulse Resp BP SpO2   02/27/23 2055 99.1 °F (37.3 °C) 84 16 108/64 98 %          Physical Exam  Vitals and nursing note reviewed. Constitutional:       Appearance: He is well-developed and normal weight. HENT:      Head: Normocephalic and atraumatic. Right Ear: Tympanic membrane and ear canal normal.      Left Ear: Tympanic membrane and ear canal normal.      Nose: Rhinorrhea present. Mouth/Throat:      Mouth: Mucous membranes are moist.      Pharynx: Oropharynx is clear. Uvula midline. Posterior oropharyngeal erythema present. Tonsils: Tonsillar exudate present. No tonsillar abscesses. 1+ on the right. 1+ on the left. Eyes:      Extraocular Movements: Extraocular movements intact. Conjunctiva/sclera: Conjunctivae normal.      Pupils: Pupils are equal, round, and reactive to light. Cardiovascular:      Rate and Rhythm: Normal rate. Heart sounds: Normal heart sounds. Pulmonary:      Effort: Pulmonary effort is normal.      Breath sounds: Normal breath sounds. Abdominal:      General: Abdomen is flat. Bowel sounds are normal.      Palpations: Abdomen is soft. Musculoskeletal:      Cervical back: Normal range of motion and neck supple.    Lymphadenopathy: Cervical: Cervical adenopathy present. Skin:     General: Skin is warm. Capillary Refill: Capillary refill takes less than 2 seconds. Neurological:      General: No focal deficit present. Mental Status: He is alert and oriented to person, place, and time. Psychiatric:         Mood and Affect: Mood normal.         Behavior: Behavior normal.        Procedures    Results for orders placed or performed during the hospital encounter of 02/27/23   Group A Strep Screen By PCR    Specimen: Throat   Result Value Ref Range    Strep, Molecular Detected     COVID-19, Rapid    Specimen: Nasopharyngeal   Result Value Ref Range    Source Nasopharyngeal      SARS-CoV-2, Rapid Not detected NOTD     Influenza A/B, Molecular    Specimen: Nasal   Result Value Ref Range    Influenza A, SHYAM Not detected      Influenza B, SHYAM Not detected          No orders to display                       Voice dictation software was used during the making of this note. This software is not perfect and grammatical and other typographical errors may be present. This note has not been completely proofread for errors.      KAYLEE Dominguez  02/27/23 2461

## 2023-02-28 NOTE — DISCHARGE INSTRUCTIONS
Drink plenty of fluids, rest, use over-the-counter ibuprofen and/or Tylenol as directed, over-the-counter Mucinex and/or Delsym as directed for cough or congestion. Return to the ED if worsening in any way.

## 2023-02-28 NOTE — ED NOTES
I have reviewed discharge instructions with the patient. The patient verbalized understanding. Patient left ED via Discharge Method: ambulatory to Home with self. Opportunity for questions and clarification provided. Patient given 0 scripts. To continue your aftercare when you leave the hospital, you may receive an automated call from our care team to check in on how you are doing. This is a free service and part of our promise to provide the best care and service to meet your aftercare needs.  If you have questions, or wish to unsubscribe from this service please call [phone]. Thank you for Choosing our [facility] Emergency Department.       Angeli Diaz RN  02/27/23 5293

## 2023-11-03 ENCOUNTER — HOSPITAL ENCOUNTER (EMERGENCY)
Age: 38
Discharge: HOME | End: 2023-11-03

## 2023-11-03 VITALS
BODY MASS INDEX: 17.18 KG/M2 | HEIGHT: 70 IN | WEIGHT: 120 LBS | RESPIRATION RATE: 18 BRPM | SYSTOLIC BLOOD PRESSURE: 120 MMHG | TEMPERATURE: 98.2 F | OXYGEN SATURATION: 97 % | DIASTOLIC BLOOD PRESSURE: 78 MMHG | HEART RATE: 74 BPM

## 2023-11-03 DIAGNOSIS — H10.9 CONJUNCTIVITIS OF RIGHT EYE, UNSPECIFIED CONJUNCTIVITIS TYPE: Primary | ICD-10-CM

## 2023-11-03 PROCEDURE — 99283 EMERGENCY DEPT VISIT LOW MDM: CPT

## 2023-11-03 RX ORDER — POLYMYXIN B SULFATE AND TRIMETHOPRIM 1; 10000 MG/ML; [USP'U]/ML
1 SOLUTION OPHTHALMIC EVERY 4 HOURS
Qty: 3 ML | Refills: 0 | Status: SHIPPED | OUTPATIENT
Start: 2023-11-03 | End: 2023-11-13

## 2023-11-03 ASSESSMENT — LIFESTYLE VARIABLES
HOW OFTEN DO YOU HAVE A DRINK CONTAINING ALCOHOL: NEVER
HOW MANY STANDARD DRINKS CONTAINING ALCOHOL DO YOU HAVE ON A TYPICAL DAY: PATIENT DOES NOT DRINK

## 2023-11-03 ASSESSMENT — PAIN SCALES - GENERAL: PAINLEVEL_OUTOF10: 7

## 2023-11-04 NOTE — ED TRIAGE NOTES
PT ambulatory to triage with c/o reddened, painful and itching right eye and chest congestion, productive cough, yellow in color. Reports having crusted particle around eye upon wakening with increased irritation and drainage. States he used OTC eye drops with no relief.

## 2023-12-03 ENCOUNTER — HOSPITAL ENCOUNTER (EMERGENCY)
Age: 38
Discharge: HOME | End: 2023-12-03

## 2023-12-03 VITALS
DIASTOLIC BLOOD PRESSURE: 83 MMHG | OXYGEN SATURATION: 96 % | BODY MASS INDEX: 17.18 KG/M2 | SYSTOLIC BLOOD PRESSURE: 113 MMHG | WEIGHT: 120 LBS | HEIGHT: 70 IN | RESPIRATION RATE: 18 BRPM | HEART RATE: 77 BPM | TEMPERATURE: 98.3 F

## 2023-12-03 DIAGNOSIS — R19.7 DIARRHEA, UNSPECIFIED TYPE: Primary | ICD-10-CM

## 2023-12-03 LAB
ALBUMIN SERPL-MCNC: 3.7 G/DL (ref 3.5–5)
ALBUMIN/GLOB SERPL: 1.1 (ref 0.4–1.6)
ALP SERPL-CCNC: 74 U/L (ref 50–136)
ALT SERPL-CCNC: 19 U/L (ref 12–65)
ANION GAP SERPL CALC-SCNC: 2 MMOL/L (ref 2–11)
AST SERPL-CCNC: 12 U/L (ref 15–37)
BASOPHILS # BLD: 0 K/UL (ref 0–0.2)
BASOPHILS NFR BLD: 0 % (ref 0–2)
BILIRUB SERPL-MCNC: 0.6 MG/DL (ref 0.2–1.1)
BUN SERPL-MCNC: 19 MG/DL (ref 6–23)
CALCIUM SERPL-MCNC: 10 MG/DL (ref 8.3–10.4)
CHLORIDE SERPL-SCNC: 105 MMOL/L (ref 101–110)
CO2 SERPL-SCNC: 33 MMOL/L (ref 21–32)
CREAT SERPL-MCNC: 1.01 MG/DL (ref 0.8–1.5)
DIFFERENTIAL METHOD BLD: ABNORMAL
EOSINOPHIL # BLD: 0.1 K/UL (ref 0–0.8)
EOSINOPHIL NFR BLD: 1 % (ref 0.5–7.8)
ERYTHROCYTE [DISTWIDTH] IN BLOOD BY AUTOMATED COUNT: 11.9 % (ref 11.9–14.6)
FLUAV RNA SPEC QL NAA+PROBE: NOT DETECTED
FLUBV RNA SPEC QL NAA+PROBE: NOT DETECTED
GLOBULIN SER CALC-MCNC: 3.5 G/DL (ref 2.8–4.5)
GLUCOSE SERPL-MCNC: 103 MG/DL (ref 65–100)
HCT VFR BLD AUTO: 42.9 % (ref 41.1–50.3)
HGB BLD-MCNC: 15 G/DL (ref 13.6–17.2)
IMM GRANULOCYTES # BLD AUTO: 0 K/UL (ref 0–0.5)
IMM GRANULOCYTES NFR BLD AUTO: 0 % (ref 0–5)
LIPASE SERPL-CCNC: 147 U/L (ref 73–393)
LYMPHOCYTES # BLD: 1.5 K/UL (ref 0.5–4.6)
LYMPHOCYTES NFR BLD: 14 % (ref 13–44)
MAGNESIUM SERPL-MCNC: 1.9 MG/DL (ref 1.8–2.4)
MCH RBC QN AUTO: 31.6 PG (ref 26.1–32.9)
MCHC RBC AUTO-ENTMCNC: 35 G/DL (ref 31.4–35)
MCV RBC AUTO: 90.5 FL (ref 82–102)
MONOCYTES # BLD: 0.7 K/UL (ref 0.1–1.3)
MONOCYTES NFR BLD: 7 % (ref 4–12)
NEUTS SEG # BLD: 7.8 K/UL (ref 1.7–8.2)
NEUTS SEG NFR BLD: 77 % (ref 43–78)
NRBC # BLD: 0 K/UL (ref 0–0.2)
PLATELET # BLD AUTO: 306 K/UL (ref 150–450)
PMV BLD AUTO: 8.2 FL (ref 9.4–12.3)
POTASSIUM SERPL-SCNC: 4.1 MMOL/L (ref 3.5–5.1)
PROT SERPL-MCNC: 7.2 G/DL (ref 6.3–8.2)
RBC # BLD AUTO: 4.74 M/UL (ref 4.23–5.6)
SARS-COV-2 RDRP RESP QL NAA+PROBE: NOT DETECTED
SODIUM SERPL-SCNC: 140 MMOL/L (ref 133–143)
SOURCE: NORMAL
WBC # BLD AUTO: 10.2 K/UL (ref 4.3–11.1)

## 2023-12-03 PROCEDURE — 87502 INFLUENZA DNA AMP PROBE: CPT

## 2023-12-03 PROCEDURE — 80053 COMPREHEN METABOLIC PANEL: CPT

## 2023-12-03 PROCEDURE — 85025 COMPLETE CBC W/AUTO DIFF WBC: CPT

## 2023-12-03 PROCEDURE — 99283 EMERGENCY DEPT VISIT LOW MDM: CPT

## 2023-12-03 PROCEDURE — 83690 ASSAY OF LIPASE: CPT

## 2023-12-03 PROCEDURE — 87635 SARS-COV-2 COVID-19 AMP PRB: CPT

## 2023-12-03 PROCEDURE — 83735 ASSAY OF MAGNESIUM: CPT

## 2023-12-03 RX ORDER — 0.9 % SODIUM CHLORIDE 0.9 %
1000 INTRAVENOUS SOLUTION INTRAVENOUS ONCE
Status: DISCONTINUED | OUTPATIENT
Start: 2023-12-03 | End: 2023-12-03

## 2023-12-03 ASSESSMENT — ENCOUNTER SYMPTOMS
DIARRHEA: 1
RESPIRATORY NEGATIVE: 1
BLOOD IN STOOL: 0

## 2023-12-03 ASSESSMENT — PAIN DESCRIPTION - LOCATION: LOCATION: ABDOMEN

## 2023-12-03 ASSESSMENT — PAIN SCALES - GENERAL: PAINLEVEL_OUTOF10: 8

## 2023-12-03 ASSESSMENT — PAIN - FUNCTIONAL ASSESSMENT: PAIN_FUNCTIONAL_ASSESSMENT: NONE - DENIES PAIN

## 2023-12-03 NOTE — ED NOTES
Pt unable to give stool sample at this time. Collection container provided to pt when able to give sample.       Mel Lopez RN  12/03/23 1469

## 2023-12-03 NOTE — DISCHARGE INSTRUCTIONS
Call your doctor for close follow up. Return if worsening. We would love to help you get a primary care doctor for follow-up after your emergency department visit. Please call [phone] between 7AM - 6PM Monday to Friday. A care navigator will be able to assist you with setting up a doctor close to your home.

## 2023-12-06 ENCOUNTER — APPOINTMENT (OUTPATIENT)
Dept: ULTRASOUND IMAGING | Age: 38
End: 2023-12-06

## 2023-12-06 ENCOUNTER — HOSPITAL ENCOUNTER (EMERGENCY)
Age: 38
Discharge: HOME | End: 2023-12-06
Attending: STUDENT IN AN ORGANIZED HEALTH CARE EDUCATION/TRAINING PROGRAM

## 2023-12-06 VITALS
RESPIRATION RATE: 15 BRPM | DIASTOLIC BLOOD PRESSURE: 76 MMHG | TEMPERATURE: 98 F | OXYGEN SATURATION: 99 % | HEIGHT: 70 IN | BODY MASS INDEX: 17.18 KG/M2 | WEIGHT: 120 LBS | HEART RATE: 62 BPM | SYSTOLIC BLOOD PRESSURE: 121 MMHG

## 2023-12-06 DIAGNOSIS — R30.0 DYSURIA: Primary | ICD-10-CM

## 2023-12-06 LAB
APPEARANCE UR: CLEAR
BILIRUB UR QL: NEGATIVE
COLOR UR: ABNORMAL
GLUCOSE UR STRIP.AUTO-MCNC: NEGATIVE MG/DL
HGB UR QL STRIP: NEGATIVE
KETONES UR QL STRIP.AUTO: NEGATIVE MG/DL
LEUKOCYTE ESTERASE UR QL STRIP.AUTO: NEGATIVE
NITRITE UR QL STRIP.AUTO: NEGATIVE
PH UR STRIP: 5.5 (ref 5–9)
PROT UR STRIP-MCNC: NEGATIVE MG/DL
SP GR UR REFRACTOMETRY: 1.02 (ref 1–1.02)
UROBILINOGEN UR QL STRIP.AUTO: 0.2 EU/DL (ref 0.2–1)

## 2023-12-06 PROCEDURE — 6360000002 HC RX W HCPCS: Performed by: STUDENT IN AN ORGANIZED HEALTH CARE EDUCATION/TRAINING PROGRAM

## 2023-12-06 PROCEDURE — 6370000000 HC RX 637 (ALT 250 FOR IP): Performed by: STUDENT IN AN ORGANIZED HEALTH CARE EDUCATION/TRAINING PROGRAM

## 2023-12-06 PROCEDURE — 86695 HERPES SIMPLEX TYPE 1 TEST: CPT

## 2023-12-06 PROCEDURE — 2580000003 HC RX 258: Performed by: STUDENT IN AN ORGANIZED HEALTH CARE EDUCATION/TRAINING PROGRAM

## 2023-12-06 PROCEDURE — 87491 CHLMYD TRACH DNA AMP PROBE: CPT

## 2023-12-06 PROCEDURE — 86696 HERPES SIMPLEX TYPE 2 TEST: CPT

## 2023-12-06 PROCEDURE — 81003 URINALYSIS AUTO W/O SCOPE: CPT

## 2023-12-06 PROCEDURE — 76870 US EXAM SCROTUM: CPT

## 2023-12-06 PROCEDURE — 87591 N.GONORRHOEAE DNA AMP PROB: CPT

## 2023-12-06 PROCEDURE — 96372 THER/PROPH/DIAG INJ SC/IM: CPT

## 2023-12-06 PROCEDURE — 99284 EMERGENCY DEPT VISIT MOD MDM: CPT

## 2023-12-06 RX ORDER — IBUPROFEN 800 MG/1
800 TABLET ORAL
Status: COMPLETED | OUTPATIENT
Start: 2023-12-06 | End: 2023-12-06

## 2023-12-06 RX ORDER — ACYCLOVIR 400 MG/1
400 TABLET ORAL EVERY 8 HOURS
Qty: 30 TABLET | Refills: 0 | Status: SHIPPED | OUTPATIENT
Start: 2023-12-06 | End: 2023-12-16

## 2023-12-06 RX ORDER — DOXYCYCLINE HYCLATE 100 MG
100 TABLET ORAL 2 TIMES DAILY
Qty: 14 TABLET | Refills: 0 | Status: SHIPPED | OUTPATIENT
Start: 2023-12-06 | End: 2023-12-13

## 2023-12-06 RX ADMIN — WATER 500 MG: 1 INJECTION INTRAMUSCULAR; INTRAVENOUS; SUBCUTANEOUS at 08:48

## 2023-12-06 RX ADMIN — IBUPROFEN 800 MG: 800 TABLET, FILM COATED ORAL at 08:48

## 2023-12-06 ASSESSMENT — PAIN DESCRIPTION - PAIN TYPE: TYPE: ACUTE PAIN

## 2023-12-06 ASSESSMENT — PAIN - FUNCTIONAL ASSESSMENT: PAIN_FUNCTIONAL_ASSESSMENT: 0-10

## 2023-12-06 ASSESSMENT — PAIN SCALES - GENERAL: PAINLEVEL_OUTOF10: 8

## 2023-12-06 ASSESSMENT — PAIN DESCRIPTION - FREQUENCY: FREQUENCY: CONTINUOUS

## 2023-12-06 ASSESSMENT — PAIN DESCRIPTION - DESCRIPTORS: DESCRIPTORS: ACHING;DISCOMFORT

## 2023-12-06 ASSESSMENT — PAIN DESCRIPTION - LOCATION: LOCATION: GROIN

## 2023-12-06 NOTE — ED PROVIDER NOTES
bilaterally over the lateral aspect bilaterally. Musculoskeletal:         General: Normal range of motion. Cervical back: Normal range of motion. Skin:     General: Skin is warm. Capillary Refill: Capillary refill takes less than 2 seconds. Neurological:      Mental Status: He is alert. Psychiatric:         Mood and Affect: Mood normal.          Procedures     Procedures    Orders Placed This Encounter   Procedures    C.trachomatis N.gonorrhoeae DNA    US SCROTUM AND TESTICLES    Urinalysis w rflx microscopic    HSV 1 and 2 Specific Ab, IgG        Medications given during this emergency department visit:  Medications   ibuprofen (ADVIL;MOTRIN) tablet 800 mg (800 mg Oral Given 12/6/23 0848)   cefTRIAXone (ROCEPHIN) 500 mg in sterile water 1.43 mL IM Injection (500 mg IntraMUSCular Given 12/6/23 0848)       New Prescriptions    ACYCLOVIR (ZOVIRAX) 400 MG TABLET    Take 1 tablet by mouth in the morning and 1 tablet at noon and 1 tablet in the evening. Do all this for 10 days. DOXYCYCLINE HYCLATE (VIBRA-TABS) 100 MG TABLET    Take 1 tablet by mouth 2 times daily for 7 days        History reviewed. No pertinent past medical history. History reviewed. No pertinent surgical history. Social History     Socioeconomic History    Marital status: Single     Spouse name: None    Number of children: None    Years of education: None    Highest education level: None   Tobacco Use    Smoking status: Never    Smokeless tobacco: Never   Substance and Sexual Activity    Alcohol use: Not Currently    Drug use: Never        Previous Medications    No medications on file        Results for orders placed or performed during the hospital encounter of 12/06/23   US SCROTUM AND TESTICLES    Narrative    Scrotal ultrasound    CLINICAL INDICATION: Testicular pain    TECHNIQUE: Grayscale, color Doppler and pulse-wave Doppler were used to evaluate  the scrotum.     FINDINGS: Right testicle measures 4.5 x 1.8 x 3.6 cm

## 2023-12-06 NOTE — DISCHARGE INSTRUCTIONS
As discussed your workup today has shown no evidence of active infection. You have pending cultures for both gonorrhea, chlamydia and herpes simplex. You should be contacted with positive results. Please follow in MyChart to ensure that these results are negative. Avoid all sexual contact until symptoms have resolved, antibiotics have been completed and final cultures are available for review. Return for worsening symptoms, concerns or questions. We would love to help you get a primary care doctor for follow-up after your emergency department visit. Please call [phone] between 7AM - 6PM Monday to Friday. A care navigator will be able to assist you with setting up a doctor close to your home.

## 2023-12-07 LAB
HSV1 IGG SER IA-ACNC: 48.5 INDEX (ref 0–0.9)
HSV2 IGG SER IA-ACNC: <0.91 INDEX (ref 0–0.9)

## 2023-12-08 NOTE — PROGRESS NOTES
ED pharmacist successfully contacted Bridgette Andersen on 12/08/23 regarding the recent results of their STI culture(s). The patient was not treated in the emergency department prior to discharge for the identified infection. The patient received a prescription for acyclovir upon discharge. Based on culture results, the patient is being adequately treated with existing antimicrobial therapy. The patient has been instructed to inform all anal, vaginal, and/or oral sex partners of the past 60 days since symptom onset of positive STI result(s). Bridgette Andersen has also been instructed to abstain from sexual encounters until after receiving treatment and symptoms have resolved. Patient has been educated to practice safe sex by using a condom during any sexual encounters. The patient has been advised to follow-up with their primary care provider or return to the ED if symptoms do not resolve or worsen. Allergies as of 12/06/2023    (No Known Allergies)     Negra Donohue, PharmD.   Emergency Medicine Clinical Pharmacist

## 2023-12-09 LAB
C TRACH RRNA SPEC QL NAA+PROBE: NEGATIVE
N GONORRHOEA RRNA SPEC QL NAA+PROBE: NEGATIVE
SPECIMEN SOURCE: NORMAL

## 2023-12-16 ENCOUNTER — HOSPITAL ENCOUNTER (EMERGENCY)
Age: 38
Discharge: HOME | End: 2023-12-16
Attending: STUDENT IN AN ORGANIZED HEALTH CARE EDUCATION/TRAINING PROGRAM
Payer: MEDICAID

## 2023-12-16 VITALS
SYSTOLIC BLOOD PRESSURE: 107 MMHG | TEMPERATURE: 98.5 F | RESPIRATION RATE: 17 BRPM | WEIGHT: 120 LBS | HEIGHT: 70 IN | HEART RATE: 64 BPM | OXYGEN SATURATION: 99 % | DIASTOLIC BLOOD PRESSURE: 75 MMHG | BODY MASS INDEX: 17.18 KG/M2

## 2023-12-16 DIAGNOSIS — N48.89 PENILE PAIN: Primary | ICD-10-CM

## 2023-12-16 LAB
APPEARANCE UR: CLEAR
BILIRUB UR QL: NEGATIVE
COLOR UR: ABNORMAL
GLUCOSE UR STRIP.AUTO-MCNC: NEGATIVE MG/DL
HGB UR QL STRIP: NEGATIVE
KETONES UR QL STRIP.AUTO: NEGATIVE MG/DL
LEUKOCYTE ESTERASE UR QL STRIP.AUTO: NEGATIVE
NITRITE UR QL STRIP.AUTO: NEGATIVE
PH UR STRIP: 6 (ref 5–9)
PROT UR STRIP-MCNC: NEGATIVE MG/DL
SP GR UR REFRACTOMETRY: 1.02 (ref 1–1.02)
UROBILINOGEN UR QL STRIP.AUTO: 1 EU/DL (ref 0.2–1)

## 2023-12-16 PROCEDURE — 81003 URINALYSIS AUTO W/O SCOPE: CPT

## 2023-12-16 RX ORDER — ACYCLOVIR 200 MG/1
400 CAPSULE ORAL 3 TIMES DAILY
Qty: 15 CAPSULE | Refills: 0 | Status: SHIPPED | OUTPATIENT
Start: 2023-12-16 | End: 2023-12-21

## 2023-12-16 ASSESSMENT — PAIN - FUNCTIONAL ASSESSMENT: PAIN_FUNCTIONAL_ASSESSMENT: 0-10

## 2023-12-16 ASSESSMENT — PAIN SCALES - GENERAL: PAINLEVEL_OUTOF10: 8

## 2023-12-16 ASSESSMENT — LIFESTYLE VARIABLES
HOW MANY STANDARD DRINKS CONTAINING ALCOHOL DO YOU HAVE ON A TYPICAL DAY: PATIENT DOES NOT DRINK
HOW OFTEN DO YOU HAVE A DRINK CONTAINING ALCOHOL: NEVER

## 2023-12-17 NOTE — DISCHARGE INSTRUCTIONS
You may take the thyroid medication as directed. Follow-up with the urology clinic in the next few weeks. Give their office a call to set up an appointment.

## 2023-12-17 NOTE — ED PROVIDER NOTES
[address]  Emergency Department    DISPOSITION Decision To Discharge 12/16/2023 11:21:09 PM       ICD-10-CM    1. Penile pain  N48.89 [facility]        ED Course     ED Course as of 12/16/23 2353   Sat Dec 16, 2023   212 29-year-old male returns to the ER with continued penile pain after recent diagnosis of HSV 1. Vitals reassuring. Urinalysis negative for infection. Reviewed previous laboratory workup. Exam overall reassuring; no testicular swelling or pain. No noted active lesions. States he noted a possible flareup a few weeks ago due to a viral illness. Will restart on his acyclovir as he had some improvement with this initially. Will give referral to urology. Return precautions given [ER]      ED Course User Index  [ER] Greyson Akins MD     Complexity of Problems Addressed:  1 or more stable acute illnesses    Data Reviewed and Analyzed:  Category 1:   I independently ordered and reviewed each unique test.     Category 2:   I independently ordered and interpreted the ED EKG in the absence of a Cardiologist.    I interpreted the labs. Category 3: Discussion of management or test interpretation. Please see ED course above    Risk of Complications and/or Morbidity of Patient Management:  Prescription drug management performed. Is this patient to be included in the SEP-1 core measure due to severe sepsis or septic shock? No Exclusion criteria - the patient is NOT to be included for SEP-1 Core Measure due to: Infection is not suspected     HPI   Isaac Bucio is a 45 y.o. male with a history of none who presents to the ED with complaint of exposure to STD. Reports he was evaluated in the ER 2 weeks ago for dysuria. Had a urine normal urinalysis at that time. Swabs were sent for further testing was negative for gonorrhea/chlamydia, however was positive for HSV-1. He was started on acyclovir and antibiotics.   States he is

## 2023-12-17 NOTE — ED TRIAGE NOTES
Pt ambulated to triage    Pt states he was given diagnosis of a herpes a \"couple weeks ago\". Pt states he has run through his anti-biotics but is still in pain and experiencing dysuria.

## 2024-01-17 ASSESSMENT — ENCOUNTER SYMPTOMS
DIARRHEA: 0
COUGH: 0
SKIN LESIONS: 0
EYE DISCHARGE: 0
BLOOD IN STOOL: 0
ABDOMINAL PAIN: 0
VOMITING: 0
SHORTNESS OF BREATH: 0
BACK PAIN: 0
WHEEZING: 0
INDIGESTION: 0
HEARTBURN: 0
CONSTIPATION: 0
NAUSEA: 0
EYE PAIN: 0

## 2024-01-17 NOTE — PROGRESS NOTES
[facility]  [address] [address]  [phone]          Mat Chilel  : 1985    Chief Complaint   Patient presents with    New Patient     Penile pain          HPI     Mat Chilel is a 38 y.o. male referred in  due to ? Of genital herpes.  HE was in ER 23 with penile pain a few weeks after unprotected intercourse.  G/C was negative.  HSV swab was +, but no visible lesions were seen.  He has no  complaint today.  He does have a rash on his torso.  Patient can think of no other instigating or exacerbating events.      Past Medical History:   Diagnosis Date    STD (sexually transmitted disease)      Past Surgical History:   Procedure Laterality Date    HERNIA REPAIR       No current outpatient medications on file.     No current facility-administered medications for this visit.     No Known Allergies  Social History     Socioeconomic History    Marital status: Single     Spouse name: Not on file    Number of children: Not on file    Years of education: Not on file    Highest education level: Not on file   Occupational History    Not on file   Tobacco Use    Smoking status: Never    Smokeless tobacco: Never   Substance and Sexual Activity    Alcohol use: Yes     Alcohol/week: 2.0 standard drinks of alcohol     Types: 1 Cans of beer, 1 Shots of liquor per week    Drug use: Not Currently    Sexual activity: Not Currently     Partners: Female   Other Topics Concern    Not on file   Social History Narrative    Not on file     Social Determinants of Health     Financial Resource Strain: Not on file   Food Insecurity: Not on file   Transportation Needs: Not on file   Physical Activity: Not on file   Stress: Not on file   Social Connections: Not on file   Intimate Partner Violence: Not on file   Housing Stability: Not on file     History reviewed. No pertinent family history.    Review of Systems  Constitutional:   Negative for fever, chills,

## 2024-01-18 ENCOUNTER — OFFICE VISIT (OUTPATIENT)
Dept: UROLOGY | Age: 39
End: 2024-01-18
Payer: MEDICAID

## 2024-01-18 DIAGNOSIS — N48.89 PENILE PAIN: Primary | ICD-10-CM

## 2024-01-18 LAB
BILIRUBIN, URINE, POC: NEGATIVE
BLOOD URINE, POC: NORMAL
GLUCOSE URINE, POC: NEGATIVE
KETONES, URINE, POC: NEGATIVE
LEUKOCYTE ESTERASE, URINE, POC: NEGATIVE
NITRITE, URINE, POC: NEGATIVE
PH, URINE, POC: 6 (ref 4.6–8)
PROTEIN,URINE, POC: NEGATIVE
SPECIFIC GRAVITY, URINE, POC: 1.03 (ref 1–1.03)
URINALYSIS CLARITY, POC: NORMAL
URINALYSIS COLOR, POC: NORMAL
UROBILINOGEN, POC: NORMAL

## 2024-01-18 PROCEDURE — 99203 OFFICE O/P NEW LOW 30 MIN: CPT | Performed by: UROLOGY

## 2024-01-18 PROCEDURE — 81003 URINALYSIS AUTO W/O SCOPE: CPT | Performed by: UROLOGY

## 2024-05-14 ENCOUNTER — HOSPITAL ENCOUNTER (EMERGENCY)
Age: 39
Discharge: HOME | End: 2024-05-14
Attending: STUDENT IN AN ORGANIZED HEALTH CARE EDUCATION/TRAINING PROGRAM
Payer: MEDICAID

## 2024-05-14 VITALS
BODY MASS INDEX: 19.33 KG/M2 | WEIGHT: 135 LBS | TEMPERATURE: 98.3 F | HEIGHT: 70 IN | RESPIRATION RATE: 16 BRPM | SYSTOLIC BLOOD PRESSURE: 126 MMHG | HEART RATE: 63 BPM | DIASTOLIC BLOOD PRESSURE: 76 MMHG | OXYGEN SATURATION: 98 %

## 2024-05-14 DIAGNOSIS — S39.012A BACK STRAIN, INITIAL ENCOUNTER: Primary | ICD-10-CM

## 2024-05-14 PROCEDURE — 6370000000 HC RX 637 (ALT 250 FOR IP): Performed by: STUDENT IN AN ORGANIZED HEALTH CARE EDUCATION/TRAINING PROGRAM

## 2024-05-14 PROCEDURE — 99283 EMERGENCY DEPT VISIT LOW MDM: CPT

## 2024-05-14 RX ORDER — LIDOCAINE 50 MG/G
1 PATCH TOPICAL DAILY
Qty: 10 PATCH | Refills: 0 | Status: SHIPPED | OUTPATIENT
Start: 2024-05-14 | End: 2024-05-24

## 2024-05-14 RX ORDER — NAPROXEN 250 MG/1
500 TABLET ORAL 2 TIMES DAILY WITH MEALS
Qty: 10 TABLET | Refills: 0 | Status: SHIPPED | OUTPATIENT
Start: 2024-05-14

## 2024-05-14 RX ORDER — METHOCARBAMOL 750 MG/1
750 TABLET, FILM COATED ORAL 4 TIMES DAILY
Qty: 20 TABLET | Refills: 0 | Status: SHIPPED | OUTPATIENT
Start: 2024-05-14 | End: 2024-05-19

## 2024-05-14 RX ORDER — NAPROXEN 250 MG/1
500 TABLET ORAL
Status: COMPLETED | OUTPATIENT
Start: 2024-05-14 | End: 2024-05-14

## 2024-05-14 RX ADMIN — NAPROXEN 500 MG: 250 TABLET ORAL at 22:03

## 2024-05-14 ASSESSMENT — PAIN DESCRIPTION - LOCATION: LOCATION: BACK

## 2024-05-14 ASSESSMENT — PAIN - FUNCTIONAL ASSESSMENT: PAIN_FUNCTIONAL_ASSESSMENT: 0-10

## 2024-05-14 ASSESSMENT — PAIN DESCRIPTION - PAIN TYPE: TYPE: ACUTE PAIN

## 2024-05-14 ASSESSMENT — PAIN SCALES - GENERAL: PAINLEVEL_OUTOF10: 10

## 2024-05-15 NOTE — ED PROVIDER NOTES
Emergency Department Provider Note       PCP: Not, On File (Inactive)   Age: 39 y.o.   Sex: male     DISPOSITION Decision To Discharge 05/14/2024 09:31:40 PM       ICD-10-CM    1. Back strain, initial encounter  S39.012A           Medical Decision Making     39-year-old male presents emergency department complaining of bilateral back pain.  States he has discomfort up and down either side of his spine.  No trauma.  States has been hurting for the past few days.  Has taken over-the-counter medication without resolution.  Denies difficulty ambulating.  Denies low back pain.  Reports it is in the mid thoracic region.  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  Denies any known trauma.  Reports worsening pain with range of motion of his torso.  On exam tenderness to palpation bilateral paraspinal thoracic muscles.  No midline tenderness.  No step-offs or deformities.  Will give dose of naproxen here in the ER.  Will treat with naproxen, Robaxin, lidocaine patches.  Outpatient follow-up return precautions given.  He voiced understanding and agreement.     1 acute, uncomplicated illness or injury.  Over the counter drug management performed.  Prescription drug management performed.  Shared medical decision making was utilized in creating the patients health plan today.    I independently ordered and reviewed each unique test.  I reviewed external records: ED visit note from an outside group.                   History     39-year-old male presents emergency department complaining of bilateral back pain.  States he has discomfort up and down either side of his spine.  No trauma.  States has been hurting for the past few days.  Has taken over-the-counter medication without resolution.  Denies difficulty ambulating.  Denies low back pain.  Reports it is in the mid thoracic region.  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  Denies

## 2024-05-15 NOTE — ED NOTES
Patient mobility status  with no difficulty. Provider aware     I have reviewed discharge instructions with the patient.  The patient verbalized understanding.    Patient left ED via Discharge Method: ambulatory to Home with  self.  .    Opportunity for questions and clarification provided.     Patient given 0 scripts.

## 2024-05-15 NOTE — DISCHARGE INSTRUCTIONS
Take naproxen twice daily as prescribed.  Use Robaxin as needed for muscle pain and spasm.  Do not drive while taking this medication as it can be sedating.  Follow-up with primary care physician as needed.  Return to the ER for worsening or worrisome symptoms.

## 2024-07-12 ENCOUNTER — HOSPITAL ENCOUNTER (EMERGENCY)
Age: 39
Discharge: HOME | End: 2024-07-12
Attending: EMERGENCY MEDICINE

## 2024-07-12 VITALS
BODY MASS INDEX: 17.18 KG/M2 | HEART RATE: 79 BPM | TEMPERATURE: 98.2 F | OXYGEN SATURATION: 98 % | DIASTOLIC BLOOD PRESSURE: 74 MMHG | RESPIRATION RATE: 18 BRPM | SYSTOLIC BLOOD PRESSURE: 115 MMHG | WEIGHT: 120 LBS | HEIGHT: 70 IN

## 2024-07-12 DIAGNOSIS — L02.91 ABSCESS: Primary | ICD-10-CM

## 2024-07-12 PROCEDURE — 2500000003 HC RX 250 WO HCPCS: Performed by: EMERGENCY MEDICINE

## 2024-07-12 PROCEDURE — 6370000000 HC RX 637 (ALT 250 FOR IP): Performed by: EMERGENCY MEDICINE

## 2024-07-12 PROCEDURE — 10061 I&D ABSCESS COMP/MULTIPLE: CPT

## 2024-07-12 PROCEDURE — 99283 EMERGENCY DEPT VISIT LOW MDM: CPT

## 2024-07-12 RX ORDER — BACITRACIN ZINC AND POLYMYXIN B SULFATE 500; 1000 [USP'U]/G; [USP'U]/G
OINTMENT TOPICAL
Qty: 4 EACH | Refills: 0 | Status: SHIPPED | OUTPATIENT
Start: 2024-07-12 | End: 2024-07-19

## 2024-07-12 RX ORDER — ACETAMINOPHEN 500 MG
1000 TABLET ORAL
Status: COMPLETED | OUTPATIENT
Start: 2024-07-12 | End: 2024-07-12

## 2024-07-12 RX ORDER — DOXYCYCLINE HYCLATE 100 MG
100 TABLET ORAL 2 TIMES DAILY
Qty: 14 TABLET | Refills: 0 | Status: SHIPPED | OUTPATIENT
Start: 2024-07-12 | End: 2024-07-19

## 2024-07-12 RX ORDER — LIDOCAINE HYDROCHLORIDE AND EPINEPHRINE 10; 10 MG/ML; UG/ML
20 INJECTION, SOLUTION INFILTRATION; PERINEURAL ONCE
Status: COMPLETED | OUTPATIENT
Start: 2024-07-12 | End: 2024-07-12

## 2024-07-12 RX ADMIN — LIDOCAINE HYDROCHLORIDE,EPINEPHRINE BITARTRATE 20 ML: 10; .01 INJECTION, SOLUTION INFILTRATION; PERINEURAL at 22:00

## 2024-07-12 RX ADMIN — ACETAMINOPHEN 1000 MG: 500 TABLET, FILM COATED ORAL at 22:00

## 2024-07-12 ASSESSMENT — PAIN - FUNCTIONAL ASSESSMENT: PAIN_FUNCTIONAL_ASSESSMENT: 0-10

## 2024-07-12 ASSESSMENT — PAIN SCALES - GENERAL: PAINLEVEL_OUTOF10: 9

## 2024-07-13 ASSESSMENT — ENCOUNTER SYMPTOMS
VOMITING: 0
SHORTNESS OF BREATH: 0
NAUSEA: 0
ABDOMINAL PAIN: 0

## 2024-07-13 NOTE — DISCHARGE INSTRUCTIONS
Take antibiotic as prescribed.  Keep site clean. Place bacitracin ointment as site as directed.  Schedule close follow-up with PCP. Return if symptoms worsen or progress in any way.

## 2025-01-13 ENCOUNTER — HOSPITAL ENCOUNTER (EMERGENCY)
Age: 40
Discharge: HOME | End: 2025-01-13

## 2025-01-13 VITALS
HEIGHT: 70 IN | WEIGHT: 140 LBS | HEART RATE: 102 BPM | RESPIRATION RATE: 18 BRPM | DIASTOLIC BLOOD PRESSURE: 68 MMHG | OXYGEN SATURATION: 94 % | BODY MASS INDEX: 20.04 KG/M2 | SYSTOLIC BLOOD PRESSURE: 120 MMHG | TEMPERATURE: 99.8 F

## 2025-01-13 DIAGNOSIS — U07.1 COVID: Primary | ICD-10-CM

## 2025-01-13 LAB
FLUAV RNA SPEC QL NAA+PROBE: NOT DETECTED
FLUBV RNA SPEC QL NAA+PROBE: NOT DETECTED
SARS-COV-2 RDRP RESP QL NAA+PROBE: DETECTED
SOURCE: ABNORMAL

## 2025-01-13 PROCEDURE — 87635 SARS-COV-2 COVID-19 AMP PRB: CPT

## 2025-01-13 PROCEDURE — 99283 EMERGENCY DEPT VISIT LOW MDM: CPT

## 2025-01-13 PROCEDURE — 87502 INFLUENZA DNA AMP PROBE: CPT

## 2025-01-13 PROCEDURE — 6370000000 HC RX 637 (ALT 250 FOR IP)

## 2025-01-13 RX ORDER — ACETAMINOPHEN 325 MG/1
650 TABLET ORAL ONCE
Status: COMPLETED | OUTPATIENT
Start: 2025-01-13 | End: 2025-01-13

## 2025-01-13 RX ADMIN — ACETAMINOPHEN 650 MG: 325 TABLET ORAL at 13:56

## 2025-01-13 ASSESSMENT — ENCOUNTER SYMPTOMS
DIARRHEA: 0
ABDOMINAL PAIN: 0
VOMITING: 0
NAUSEA: 0
COUGH: 1
CHEST TIGHTNESS: 0
SHORTNESS OF BREATH: 0

## 2025-01-13 ASSESSMENT — PAIN DESCRIPTION - ORIENTATION: ORIENTATION: OTHER (COMMENT)

## 2025-01-13 ASSESSMENT — PAIN DESCRIPTION - LOCATION
LOCATION: GENERALIZED
LOCATION: GENERALIZED

## 2025-01-13 ASSESSMENT — PAIN - FUNCTIONAL ASSESSMENT: PAIN_FUNCTIONAL_ASSESSMENT: 0-10

## 2025-01-13 ASSESSMENT — LIFESTYLE VARIABLES
HOW OFTEN DO YOU HAVE A DRINK CONTAINING ALCOHOL: PATIENT DECLINED
HOW MANY STANDARD DRINKS CONTAINING ALCOHOL DO YOU HAVE ON A TYPICAL DAY: PATIENT DECLINED

## 2025-01-13 ASSESSMENT — PAIN DESCRIPTION - DESCRIPTORS
DESCRIPTORS: ACHING
DESCRIPTORS: ACHING

## 2025-01-13 ASSESSMENT — PAIN SCALES - GENERAL
PAINLEVEL_OUTOF10: 10
PAINLEVEL_OUTOF10: 10

## 2025-01-13 NOTE — ED PROVIDER NOTES
Breath sounds: Normal breath sounds.   Musculoskeletal:         General: Normal range of motion.   Skin:     General: Skin is warm.      Capillary Refill: Capillary refill takes less than 2 seconds.   Neurological:      General: No focal deficit present.      Mental Status: He is alert and oriented to person, place, and time.   Psychiatric:         Mood and Affect: Mood normal.         Behavior: Behavior normal.        Procedures     Procedures    Orders placed during this emergency department visit:     Orders Placed This Encounter   Procedures    COVID-19, Rapid    Influenza A/B, Molecular        Medications given during this emergency department visit:     Medications   acetaminophen (TYLENOL) tablet 650 mg (has no administration in time range)       New prescriptions:     New Prescriptions    No medications on file        Past History and Complexity:     Past Medical History:   Diagnosis Date    STD (sexually transmitted disease)         Past Surgical History:   Procedure Laterality Date    HERNIA REPAIR          Social History     Socioeconomic History    Marital status: Single   Tobacco Use    Smoking status: Never    Smokeless tobacco: Never   Substance and Sexual Activity    Alcohol use: Yes     Alcohol/week: 2.0 standard drinks of alcohol     Types: 1 Cans of beer, 1 Shots of liquor per week    Drug use: Not Currently    Sexual activity: Not Currently     Partners: Female     Social Determinants of Health     Social Connections: Unknown (8/16/2022)    Received from [facility]    Social Connections     Frequency of Communication with Friends and Family: Not asked     Frequency of Social Gatherings with Friends and Family: Not asked   Intimate Partner Violence: Unknown (8/16/2022)    Received from [facility]    Intimate Partner Violence     Fear of Current or Ex-Partner: Not asked     Emotionally Abused: Not asked     Physically Abused: Not asked     Sexually Abused: Not

## 2025-01-13 NOTE — ED TRIAGE NOTES
Pt ambulatory to ED w/ cc of body aches, cough, fatigue, fever x 2 days. Pt denies dysphagia. Pt took dayquil yesterday w/ some improvement. Pt A&O x 4

## 2025-01-13 NOTE — DISCHARGE INSTRUCTIONS
Your COVID testing was positive.  Use Tylenol or ibuprofen as needed for pain or bodyaches.  Return for any uncontrolled symptoms.

## 2025-01-14 NOTE — ED NOTES
Contact made with pt by this RN on 1/14 for follow up call post discharge from the ED.      Darya Norton, RN  01/14/25 7212